# Patient Record
Sex: FEMALE | Race: WHITE | NOT HISPANIC OR LATINO
[De-identification: names, ages, dates, MRNs, and addresses within clinical notes are randomized per-mention and may not be internally consistent; named-entity substitution may affect disease eponyms.]

---

## 2018-11-20 PROBLEM — Z00.00 ENCOUNTER FOR PREVENTIVE HEALTH EXAMINATION: Status: ACTIVE | Noted: 2018-11-20

## 2018-12-19 ENCOUNTER — APPOINTMENT (OUTPATIENT)
Dept: OBGYN | Facility: CLINIC | Age: 33
End: 2018-12-19

## 2021-02-01 ENCOUNTER — ASOB RESULT (OUTPATIENT)
Age: 36
End: 2021-02-01

## 2021-02-01 ENCOUNTER — APPOINTMENT (OUTPATIENT)
Dept: MATERNAL FETAL MEDICINE | Facility: CLINIC | Age: 36
End: 2021-02-01

## 2021-02-03 ENCOUNTER — ASOB RESULT (OUTPATIENT)
Age: 36
End: 2021-02-03

## 2021-02-03 ENCOUNTER — APPOINTMENT (OUTPATIENT)
Dept: ANTEPARTUM | Facility: CLINIC | Age: 36
End: 2021-02-03
Payer: COMMERCIAL

## 2021-02-03 PROCEDURE — 36416 COLLJ CAPILLARY BLOOD SPEC: CPT

## 2021-02-03 PROCEDURE — 76813 OB US NUCHAL MEAS 1 GEST: CPT

## 2021-02-03 PROCEDURE — 76801 OB US < 14 WKS SINGLE FETUS: CPT

## 2021-02-03 PROCEDURE — 99072 ADDL SUPL MATRL&STAF TM PHE: CPT

## 2021-02-12 ENCOUNTER — NON-APPOINTMENT (OUTPATIENT)
Age: 36
End: 2021-02-12

## 2021-04-06 ENCOUNTER — APPOINTMENT (OUTPATIENT)
Dept: ANTEPARTUM | Facility: CLINIC | Age: 36
End: 2021-04-06
Payer: COMMERCIAL

## 2021-04-06 ENCOUNTER — ASOB RESULT (OUTPATIENT)
Age: 36
End: 2021-04-06

## 2021-04-06 PROCEDURE — 99072 ADDL SUPL MATRL&STAF TM PHE: CPT

## 2021-04-06 PROCEDURE — 76811 OB US DETAILED SNGL FETUS: CPT

## 2021-08-04 ENCOUNTER — INPATIENT (INPATIENT)
Facility: HOSPITAL | Age: 36
LOS: 3 days | Discharge: HOME | End: 2021-08-08
Attending: OBSTETRICS & GYNECOLOGY | Admitting: OBSTETRICS & GYNECOLOGY

## 2021-08-04 VITALS
OXYGEN SATURATION: 100 % | WEIGHT: 195.11 LBS | HEIGHT: 68 IN | RESPIRATION RATE: 17 BRPM | TEMPERATURE: 99 F | DIASTOLIC BLOOD PRESSURE: 84 MMHG | SYSTOLIC BLOOD PRESSURE: 152 MMHG | HEART RATE: 100 BPM

## 2021-08-04 DIAGNOSIS — Z3A.00 WEEKS OF GESTATION OF PREGNANCY NOT SPECIFIED: ICD-10-CM

## 2021-08-04 DIAGNOSIS — O26.899 OTHER SPECIFIED PREGNANCY RELATED CONDITIONS, UNSPECIFIED TRIMESTER: ICD-10-CM

## 2021-08-04 DIAGNOSIS — D22.9 MELANOCYTIC NEVI, UNSPECIFIED: Chronic | ICD-10-CM

## 2021-08-04 LAB
ALBUMIN SERPL ELPH-MCNC: 4.2 G/DL — SIGNIFICANT CHANGE UP (ref 3.3–5)
ALP SERPL-CCNC: 140 U/L — HIGH (ref 40–120)
ALT FLD-CCNC: 13 U/L — SIGNIFICANT CHANGE UP (ref 4–33)
ANION GAP SERPL CALC-SCNC: 15 MMOL/L — HIGH (ref 7–14)
APPEARANCE UR: CLEAR — SIGNIFICANT CHANGE UP
APTT BLD: 29.3 SEC — SIGNIFICANT CHANGE UP (ref 27–36.3)
AST SERPL-CCNC: 23 U/L — SIGNIFICANT CHANGE UP (ref 4–32)
BASOPHILS # BLD AUTO: 0.02 K/UL — SIGNIFICANT CHANGE UP (ref 0–0.2)
BASOPHILS NFR BLD AUTO: 0.2 % — SIGNIFICANT CHANGE UP (ref 0–2)
BILIRUB SERPL-MCNC: 0.2 MG/DL — SIGNIFICANT CHANGE UP (ref 0.2–1.2)
BILIRUB UR-MCNC: NEGATIVE — SIGNIFICANT CHANGE UP
BLD GP AB SCN SERPL QL: NEGATIVE — SIGNIFICANT CHANGE UP
BUN SERPL-MCNC: 6 MG/DL — LOW (ref 7–23)
CALCIUM SERPL-MCNC: 10.2 MG/DL — SIGNIFICANT CHANGE UP (ref 8.4–10.5)
CHLORIDE SERPL-SCNC: 102 MMOL/L — SIGNIFICANT CHANGE UP (ref 98–107)
CO2 SERPL-SCNC: 20 MMOL/L — LOW (ref 22–31)
COLOR SPEC: SIGNIFICANT CHANGE UP
CREAT ?TM UR-MCNC: 18 MG/DL — SIGNIFICANT CHANGE UP
CREAT SERPL-MCNC: 0.52 MG/DL — SIGNIFICANT CHANGE UP (ref 0.5–1.3)
DIFF PNL FLD: NEGATIVE — SIGNIFICANT CHANGE UP
EOSINOPHIL # BLD AUTO: 0.06 K/UL — SIGNIFICANT CHANGE UP (ref 0–0.5)
EOSINOPHIL NFR BLD AUTO: 0.7 % — SIGNIFICANT CHANGE UP (ref 0–6)
FIBRINOGEN PPP-MCNC: 762 MG/DL — HIGH (ref 290–520)
GLUCOSE SERPL-MCNC: 92 MG/DL — SIGNIFICANT CHANGE UP (ref 70–99)
GLUCOSE UR QL: NEGATIVE — SIGNIFICANT CHANGE UP
HCT VFR BLD CALC: 39.4 % — SIGNIFICANT CHANGE UP (ref 34.5–45)
HGB BLD-MCNC: 12.2 G/DL — SIGNIFICANT CHANGE UP (ref 11.5–15.5)
IANC: 6.84 K/UL — SIGNIFICANT CHANGE UP (ref 1.5–8.5)
IMM GRANULOCYTES NFR BLD AUTO: 0.7 % — SIGNIFICANT CHANGE UP (ref 0–1.5)
INR BLD: 0.99 RATIO — SIGNIFICANT CHANGE UP (ref 0.88–1.16)
KETONES UR-MCNC: NEGATIVE — SIGNIFICANT CHANGE UP
LDH SERPL L TO P-CCNC: 227 U/L — HIGH (ref 135–225)
LEUKOCYTE ESTERASE UR-ACNC: NEGATIVE — SIGNIFICANT CHANGE UP
LYMPHOCYTES # BLD AUTO: 1.58 K/UL — SIGNIFICANT CHANGE UP (ref 1–3.3)
LYMPHOCYTES # BLD AUTO: 17.3 % — SIGNIFICANT CHANGE UP (ref 13–44)
MCHC RBC-ENTMCNC: 26.9 PG — LOW (ref 27–34)
MCHC RBC-ENTMCNC: 31 GM/DL — LOW (ref 32–36)
MCV RBC AUTO: 86.8 FL — SIGNIFICANT CHANGE UP (ref 80–100)
MONOCYTES # BLD AUTO: 0.59 K/UL — SIGNIFICANT CHANGE UP (ref 0–0.9)
MONOCYTES NFR BLD AUTO: 6.4 % — SIGNIFICANT CHANGE UP (ref 2–14)
NEUTROPHILS # BLD AUTO: 6.84 K/UL — SIGNIFICANT CHANGE UP (ref 1.8–7.4)
NEUTROPHILS NFR BLD AUTO: 74.7 % — SIGNIFICANT CHANGE UP (ref 43–77)
NITRITE UR-MCNC: NEGATIVE — SIGNIFICANT CHANGE UP
NRBC # BLD: 0 /100 WBCS — SIGNIFICANT CHANGE UP
NRBC # FLD: 0 K/UL — SIGNIFICANT CHANGE UP
PH UR: 7.5 — SIGNIFICANT CHANGE UP (ref 5–8)
PLATELET # BLD AUTO: 329 K/UL — SIGNIFICANT CHANGE UP (ref 150–400)
POTASSIUM SERPL-MCNC: 4.6 MMOL/L — SIGNIFICANT CHANGE UP (ref 3.5–5.3)
POTASSIUM SERPL-SCNC: 4.6 MMOL/L — SIGNIFICANT CHANGE UP (ref 3.5–5.3)
PROT ?TM UR-MCNC: 7 MG/DL — SIGNIFICANT CHANGE UP
PROT ?TM UR-MCNC: 7 MG/DL — SIGNIFICANT CHANGE UP
PROT SERPL-MCNC: 7.6 G/DL — SIGNIFICANT CHANGE UP (ref 6–8.3)
PROT UR-MCNC: NEGATIVE — SIGNIFICANT CHANGE UP
PROT/CREAT UR-RTO: 0.4 RATIO — HIGH (ref 0–0.2)
PROTHROM AB SERPL-ACNC: 11.4 SEC — SIGNIFICANT CHANGE UP (ref 10.6–13.6)
RBC # BLD: 4.54 M/UL — SIGNIFICANT CHANGE UP (ref 3.8–5.2)
RBC # FLD: 13.2 % — SIGNIFICANT CHANGE UP (ref 10.3–14.5)
RH IG SCN BLD-IMP: POSITIVE — SIGNIFICANT CHANGE UP
SARS-COV-2 RNA SPEC QL NAA+PROBE: SIGNIFICANT CHANGE UP
SODIUM SERPL-SCNC: 137 MMOL/L — SIGNIFICANT CHANGE UP (ref 135–145)
SP GR SPEC: 1.01 — LOW (ref 1.01–1.02)
URATE SERPL-MCNC: 2.5 MG/DL — SIGNIFICANT CHANGE UP (ref 2.5–7)
UROBILINOGEN FLD QL: SIGNIFICANT CHANGE UP
WBC # BLD: 9.15 K/UL — SIGNIFICANT CHANGE UP (ref 3.8–10.5)
WBC # FLD AUTO: 9.15 K/UL — SIGNIFICANT CHANGE UP (ref 3.8–10.5)

## 2021-08-04 RX ORDER — CITRIC ACID/SODIUM CITRATE 300-500 MG
15 SOLUTION, ORAL ORAL EVERY 6 HOURS
Refills: 0 | Status: DISCONTINUED | OUTPATIENT
Start: 2021-08-04 | End: 2021-08-05

## 2021-08-04 RX ORDER — SODIUM CHLORIDE 9 MG/ML
1000 INJECTION, SOLUTION INTRAVENOUS
Refills: 0 | Status: DISCONTINUED | OUTPATIENT
Start: 2021-08-04 | End: 2021-08-05

## 2021-08-04 RX ORDER — OXYTOCIN 10 UNIT/ML
333.33 VIAL (ML) INJECTION
Qty: 20 | Refills: 0 | Status: DISCONTINUED | OUTPATIENT
Start: 2021-08-04 | End: 2021-08-06

## 2021-08-04 RX ORDER — OXYTOCIN 10 UNIT/ML
333.33 VIAL (ML) INJECTION
Qty: 20 | Refills: 0 | Status: DISCONTINUED | OUTPATIENT
Start: 2021-08-04 | End: 2021-08-04

## 2021-08-04 RX ORDER — SODIUM CHLORIDE 9 MG/ML
3 INJECTION INTRAMUSCULAR; INTRAVENOUS; SUBCUTANEOUS EVERY 8 HOURS
Refills: 0 | Status: DISCONTINUED | OUTPATIENT
Start: 2021-08-04 | End: 2021-08-08

## 2021-08-04 RX ORDER — SODIUM CHLORIDE 9 MG/ML
1000 INJECTION, SOLUTION INTRAVENOUS
Refills: 0 | Status: DISCONTINUED | OUTPATIENT
Start: 2021-08-04 | End: 2021-08-04

## 2021-08-04 RX ORDER — CITRIC ACID/SODIUM CITRATE 300-500 MG
15 SOLUTION, ORAL ORAL EVERY 6 HOURS
Refills: 0 | Status: DISCONTINUED | OUTPATIENT
Start: 2021-08-04 | End: 2021-08-04

## 2021-08-04 RX ADMIN — Medication 15 MILLILITER(S): at 20:22

## 2021-08-04 NOTE — OB PROVIDER H&P - NSHPLABSRESULTS_GEN_ALL_CORE
CBC Full  -  ( 04 Aug 2021 14:38 )  WBC Count : 9.15 K/uL  RBC Count : 4.54 M/uL  Hemoglobin : 12.2 g/dL  Hematocrit : 39.4 %  Platelet Count - Automated : 329 K/uL  Mean Cell Volume : 86.8 fL  Mean Cell Hemoglobin : 26.9 pg  Mean Cell Hemoglobin Concentration : 31.0 gm/dL  Auto Neutrophil # : 6.84 K/uL  Auto Lymphocyte # : 1.58 K/uL  Auto Monocyte # : 0.59 K/uL  Auto Eosinophil # : 0.06 K/uL  Auto Basophil # : 0.02 K/uL  Auto Neutrophil % : 74.7 %  Auto Lymphocyte % : 17.3 %  Auto Monocyte % : 6.4 %  Auto Eosinophil % : 0.7 %  Auto Basophil % : 0.2 %        137  |  102  |  6<L>  ----------------------------<  92  4.6   |  20<L>  |  0.52    Ca    10.2      04 Aug 2021 14:38    TPro  7.6  /  Alb  4.2  /  TBili  0.2  /  DBili  x   /  AST  23  /  ALT  13  /  AlkPhos  140<H>      LDH: 227    uric acid: 2.5      PT/INR - ( 04 Aug 2021 14:38 )   PT: 11.4 sec;   INR: 0.99 ratio         PTT - ( 04 Aug 2021 14:38 )  PTT:29.3 sec    fibrinogen: 762    Urinalysis Basic - ( 04 Aug 2021 14:38 )    Color: Light Yellow / Appearance: Clear / S.007 / pH: x  Gluc: x / Ketone: Negative  / Bili: Negative / Urobili: <2 mg/dL   Blood: x / Protein: Negative / Nitrite: Negative   Leuk Esterase: Negative / RBC: x / WBC x   Sq Epi: x / Non Sq Epi: x / Bacteria: x      PCR: 0.4

## 2021-08-04 NOTE — CHART NOTE - NSCHARTNOTEFT_GEN_A_CORE
R1 Labor Progress Note    Patient examined at bedside for placement of vaginal cytotec.    SVE  0/0/-3    Vaginal cytotec dose #1 placed at 10p.    Charlene Ragland  PGY-1

## 2021-08-04 NOTE — OB PROVIDER H&P - HISTORY OF PRESENT ILLNESS
35 y/o  @ 38.3 wks gestation presents from Dr Day's office with elevated /80 and 148/80 denies any headache visual disturbances or right upper epigastric pain denies any uc's vb or lof reports +FM denies any n/v/d denies any fever or chills ap care has been uncomplicated thus far     Covid vaccine   [employer] #2 3/20/2021

## 2021-08-04 NOTE — OB RN TRIAGE NOTE - CHIEF COMPLAINT QUOTE
sent from MD office with elevated BP  denies headache, visual changes, SOB, chest/epigastric pain, dizziness, vomiting, swelling

## 2021-08-04 NOTE — OB PROVIDER TRIAGE NOTE - HISTORY OF PRESENT ILLNESS
37 y/o  @ 38.3 wks gestation presents from Dr Day's office with elevated /80 and 148/80 denies any headache visual disturbances or right upper epigastric pain denies any uc's vb or lof reports +FM denies any n/v/d denies any fever or chills ap care has been uncomplicated thus far     Covid vaccine   [employer] #2 3/20/2021

## 2021-08-04 NOTE — OB PROVIDER TRIAGE NOTE - NSHPPHYSICALEXAM_GEN_ALL_CORE
abdomen: soft, nt on palp  T(C): 37.2 (08-04-21 @ 13:59), Max: 37.2 (08-04-21 @ 13:53)  HR: 105 (08-04-21 @ 15:20) (92 - 113)  BP: 114/68 (08-04-21 @ 15:20) (114/68 - 152/84) semi fowlers position   RR: 17 (08-04-21 @ 13:53) (17 - 17)  SpO2: 100% (08-04-21 @ 13:53) (100% - 100%) abdomen: soft, nt on palp  T(C): 37.2 (08-04-21 @ 13:59), Max: 37.2 (08-04-21 @ 13:53)  HR: 105 (08-04-21 @ 15:20) (92 - 113)  BP: 114/68 (08-04-21 @ 15:20) (114/68 - 152/84) semi fowlers position   RR: 17 (08-04-21 @ 13:53) (17 - 17)  SpO2: 100% (08-04-21 @ 13:53) (100% - 100%)  TAS: BPP: 8/8 vtx DEUCE: 15.65 posterior placenta

## 2021-08-04 NOTE — OB PROVIDER TRIAGE NOTE - NSOBPROVIDERNOTE_OBGYN_ALL_OB_FT
37 y/o  @ 38.3 wks gestation for po cytotec IOL / PEC   plan of care d/w dr yu  admit to l&D  38.3 wks gestation for po cytotec IOL/ PEC   see admission orders 35 y/o  @ 38.3 wks gestation for po cytotec IOL / PEC   plan of care d/w dr yu / dr frankel   admit to l&D  38.3 wks gestation for po cytotec IOL/ PEC   see admission orders

## 2021-08-04 NOTE — OB PROVIDER TRIAGE NOTE - NSHPLABSRESULTS_GEN_ALL_CORE
CBC Full  -  ( 04 Aug 2021 14:38 )  WBC Count : 9.15 K/uL  RBC Count : 4.54 M/uL  Hemoglobin : 12.2 g/dL  Hematocrit : 39.4 %  Platelet Count - Automated : 329 K/uL  Mean Cell Volume : 86.8 fL  Mean Cell Hemoglobin : 26.9 pg  Mean Cell Hemoglobin Concentration : 31.0 gm/dL  Auto Neutrophil # : 6.84 K/uL  Auto Lymphocyte # : 1.58 K/uL  Auto Monocyte # : 0.59 K/uL  Auto Eosinophil # : 0.06 K/uL  Auto Basophil # : 0.02 K/uL  Auto Neutrophil % : 74.7 %  Auto Lymphocyte % : 17.3 %  Auto Monocyte % : 6.4 %  Auto Eosinophil % : 0.7 %  Auto Basophil % : 0.2 %        137  |  102  |  6<L>  ----------------------------<  92  4.6   |  20<L>  |  0.52    Ca    10.2      04 Aug 2021 14:38    TPro  7.6  /  Alb  4.2  /  TBili  0.2  /  DBili  x   /  AST  23  /  ALT  13  /  AlkPhos  140<H>      PT/INR - ( 04 Aug 2021 14:38 )   PT: 11.4 sec;   INR: 0.99 ratio         PTT - ( 04 Aug 2021 14:38 )  PTT:29.3 sec    Urinalysis Basic - ( 04 Aug 2021 14:38 )    Color: Light Yellow / Appearance: Clear / S.007 / pH: x  Gluc: x / Ketone: Negative  / Bili: Negative / Urobili: <2 mg/dL   Blood: x / Protein: Negative / Nitrite: Negative   Leuk Esterase: Negative / RBC: x / WBC x   Sq Epi: x / Non Sq Epi: x / Bacteria: x CBC Full  -  ( 04 Aug 2021 14:38 )  WBC Count : 9.15 K/uL  RBC Count : 4.54 M/uL  Hemoglobin : 12.2 g/dL  Hematocrit : 39.4 %  Platelet Count - Automated : 329 K/uL  Mean Cell Volume : 86.8 fL  Mean Cell Hemoglobin : 26.9 pg  Mean Cell Hemoglobin Concentration : 31.0 gm/dL  Auto Neutrophil # : 6.84 K/uL  Auto Lymphocyte # : 1.58 K/uL  Auto Monocyte # : 0.59 K/uL  Auto Eosinophil # : 0.06 K/uL  Auto Basophil # : 0.02 K/uL  Auto Neutrophil % : 74.7 %  Auto Lymphocyte % : 17.3 %  Auto Monocyte % : 6.4 %  Auto Eosinophil % : 0.7 %  Auto Basophil % : 0.2 %        137  |  102  |  6<L>  ----------------------------<  92  4.6   |  20<L>  |  0.52    Ca    10.2      04 Aug 2021 14:38    TPro  7.6  /  Alb  4.2  /  TBili  0.2  /  DBili  x   /  AST  23  /  ALT  13  /  AlkPhos  140<H>      LDH: 227    uric acid: 2.5      PT/INR - ( 04 Aug 2021 14:38 )   PT: 11.4 sec;   INR: 0.99 ratio         PTT - ( 04 Aug 2021 14:38 )  PTT:29.3 sec    fibrinogen: 762    Urinalysis Basic - ( 04 Aug 2021 14:38 )    Color: Light Yellow / Appearance: Clear / S.007 / pH: x  Gluc: x / Ketone: Negative  / Bili: Negative / Urobili: <2 mg/dL   Blood: x / Protein: Negative / Nitrite: Negative   Leuk Esterase: Negative / RBC: x / WBC x   Sq Epi: x / Non Sq Epi: x / Bacteria: x      PCR: 0.4

## 2021-08-04 NOTE — OB PROVIDER H&P - ASSESSMENT
35 y/o  @ 38.3 wks gestation for po cytotec IOL / PEC   plan of care d/w dr yu / dr frankel   GBS- neg 2021  admit to l&D  38.3 wks gestation for po cytotec IOL/ PEC   see admission orders

## 2021-08-04 NOTE — OB PROVIDER H&P - NSHPPHYSICALEXAM_GEN_ALL_CORE
abdomen: soft, nt on palp  T(C): 37.2 (08-04-21 @ 13:59), Max: 37.2 (08-04-21 @ 13:53)  HR: 105 (08-04-21 @ 15:20) (92 - 113)  BP: 114/68 (08-04-21 @ 15:20) (114/68 - 152/84) semi fowlers position   RR: 17 (08-04-21 @ 13:53) (17 - 17)  SpO2: 100% (08-04-21 @ 13:53) (100% - 100%)  TAS: BPP: 8/8 vtx DEUCE: 15.65 posterior placenta    SVE: closed/0/-3

## 2021-08-05 ENCOUNTER — TRANSCRIPTION ENCOUNTER (OUTPATIENT)
Age: 36
End: 2021-08-05

## 2021-08-05 LAB
ALBUMIN SERPL ELPH-MCNC: 3.4 G/DL — SIGNIFICANT CHANGE UP (ref 3.3–5)
ALBUMIN SERPL ELPH-MCNC: 3.8 G/DL — SIGNIFICANT CHANGE UP (ref 3.3–5)
ALP SERPL-CCNC: 119 U/L — SIGNIFICANT CHANGE UP (ref 40–120)
ALP SERPL-CCNC: 126 U/L — HIGH (ref 40–120)
ALT FLD-CCNC: 10 U/L — SIGNIFICANT CHANGE UP (ref 4–33)
ALT FLD-CCNC: 10 U/L — SIGNIFICANT CHANGE UP (ref 4–33)
ANION GAP SERPL CALC-SCNC: 14 MMOL/L — SIGNIFICANT CHANGE UP (ref 7–14)
ANION GAP SERPL CALC-SCNC: 15 MMOL/L — HIGH (ref 7–14)
APTT BLD: 28.3 SEC — SIGNIFICANT CHANGE UP (ref 27–36.3)
APTT BLD: 28.4 SEC — SIGNIFICANT CHANGE UP (ref 27–36.3)
AST SERPL-CCNC: 14 U/L — SIGNIFICANT CHANGE UP (ref 4–32)
AST SERPL-CCNC: 16 U/L — SIGNIFICANT CHANGE UP (ref 4–32)
BASOPHILS # BLD AUTO: 0.03 K/UL — SIGNIFICANT CHANGE UP (ref 0–0.2)
BASOPHILS # BLD AUTO: 0.05 K/UL — SIGNIFICANT CHANGE UP (ref 0–0.2)
BASOPHILS NFR BLD AUTO: 0.2 % — SIGNIFICANT CHANGE UP (ref 0–2)
BASOPHILS NFR BLD AUTO: 0.4 % — SIGNIFICANT CHANGE UP (ref 0–2)
BILIRUB SERPL-MCNC: 0.3 MG/DL — SIGNIFICANT CHANGE UP (ref 0.2–1.2)
BILIRUB SERPL-MCNC: 0.4 MG/DL — SIGNIFICANT CHANGE UP (ref 0.2–1.2)
BUN SERPL-MCNC: 5 MG/DL — LOW (ref 7–23)
BUN SERPL-MCNC: 5 MG/DL — LOW (ref 7–23)
CALCIUM SERPL-MCNC: 9.2 MG/DL — SIGNIFICANT CHANGE UP (ref 8.4–10.5)
CALCIUM SERPL-MCNC: 9.5 MG/DL — SIGNIFICANT CHANGE UP (ref 8.4–10.5)
CHLORIDE SERPL-SCNC: 103 MMOL/L — SIGNIFICANT CHANGE UP (ref 98–107)
CHLORIDE SERPL-SCNC: 107 MMOL/L — SIGNIFICANT CHANGE UP (ref 98–107)
CO2 SERPL-SCNC: 16 MMOL/L — LOW (ref 22–31)
CO2 SERPL-SCNC: 17 MMOL/L — LOW (ref 22–31)
COVID-19 SPIKE DOMAIN AB INTERP: POSITIVE
COVID-19 SPIKE DOMAIN ANTIBODY RESULT: >250 U/ML — HIGH
CREAT SERPL-MCNC: 0.43 MG/DL — LOW (ref 0.5–1.3)
CREAT SERPL-MCNC: 0.45 MG/DL — LOW (ref 0.5–1.3)
EOSINOPHIL # BLD AUTO: 0.04 K/UL — SIGNIFICANT CHANGE UP (ref 0–0.5)
EOSINOPHIL # BLD AUTO: 0.1 K/UL — SIGNIFICANT CHANGE UP (ref 0–0.5)
EOSINOPHIL NFR BLD AUTO: 0.3 % — SIGNIFICANT CHANGE UP (ref 0–6)
EOSINOPHIL NFR BLD AUTO: 0.8 % — SIGNIFICANT CHANGE UP (ref 0–6)
FIBRINOGEN PPP-MCNC: 642 MG/DL — HIGH (ref 290–520)
FIBRINOGEN PPP-MCNC: 733 MG/DL — HIGH (ref 290–520)
GLUCOSE SERPL-MCNC: 85 MG/DL — SIGNIFICANT CHANGE UP (ref 70–99)
GLUCOSE SERPL-MCNC: 86 MG/DL — SIGNIFICANT CHANGE UP (ref 70–99)
HCT VFR BLD CALC: 36.3 % — SIGNIFICANT CHANGE UP (ref 34.5–45)
HCT VFR BLD CALC: 38.4 % — SIGNIFICANT CHANGE UP (ref 34.5–45)
HGB BLD-MCNC: 11.7 G/DL — SIGNIFICANT CHANGE UP (ref 11.5–15.5)
HGB BLD-MCNC: 12.4 G/DL — SIGNIFICANT CHANGE UP (ref 11.5–15.5)
IANC: 10.74 K/UL — HIGH (ref 1.5–8.5)
IANC: 9.27 K/UL — HIGH (ref 1.5–8.5)
IMM GRANULOCYTES NFR BLD AUTO: 0.6 % — SIGNIFICANT CHANGE UP (ref 0–1.5)
IMM GRANULOCYTES NFR BLD AUTO: 0.6 % — SIGNIFICANT CHANGE UP (ref 0–1.5)
INR BLD: 0.99 RATIO — SIGNIFICANT CHANGE UP (ref 0.88–1.16)
INR BLD: 1.05 RATIO — SIGNIFICANT CHANGE UP (ref 0.88–1.16)
LDH SERPL L TO P-CCNC: 145 U/L — SIGNIFICANT CHANGE UP (ref 135–225)
LDH SERPL L TO P-CCNC: 162 U/L — SIGNIFICANT CHANGE UP (ref 135–225)
LYMPHOCYTES # BLD AUTO: 1.82 K/UL — SIGNIFICANT CHANGE UP (ref 1–3.3)
LYMPHOCYTES # BLD AUTO: 13.5 % — SIGNIFICANT CHANGE UP (ref 13–44)
LYMPHOCYTES # BLD AUTO: 2.73 K/UL — SIGNIFICANT CHANGE UP (ref 1–3.3)
LYMPHOCYTES # BLD AUTO: 20.6 % — SIGNIFICANT CHANGE UP (ref 13–44)
MCHC RBC-ENTMCNC: 27.3 PG — SIGNIFICANT CHANGE UP (ref 27–34)
MCHC RBC-ENTMCNC: 27.6 PG — SIGNIFICANT CHANGE UP (ref 27–34)
MCHC RBC-ENTMCNC: 32.2 GM/DL — SIGNIFICANT CHANGE UP (ref 32–36)
MCHC RBC-ENTMCNC: 32.3 GM/DL — SIGNIFICANT CHANGE UP (ref 32–36)
MCV RBC AUTO: 84.8 FL — SIGNIFICANT CHANGE UP (ref 80–100)
MCV RBC AUTO: 85.5 FL — SIGNIFICANT CHANGE UP (ref 80–100)
MONOCYTES # BLD AUTO: 0.76 K/UL — SIGNIFICANT CHANGE UP (ref 0–0.9)
MONOCYTES # BLD AUTO: 1.02 K/UL — HIGH (ref 0–0.9)
MONOCYTES NFR BLD AUTO: 5.6 % — SIGNIFICANT CHANGE UP (ref 2–14)
MONOCYTES NFR BLD AUTO: 7.7 % — SIGNIFICANT CHANGE UP (ref 2–14)
NEUTROPHILS # BLD AUTO: 10.74 K/UL — HIGH (ref 1.8–7.4)
NEUTROPHILS # BLD AUTO: 9.27 K/UL — HIGH (ref 1.8–7.4)
NEUTROPHILS NFR BLD AUTO: 69.9 % — SIGNIFICANT CHANGE UP (ref 43–77)
NEUTROPHILS NFR BLD AUTO: 79.8 % — HIGH (ref 43–77)
NRBC # BLD: 0 /100 WBCS — SIGNIFICANT CHANGE UP
NRBC # BLD: 0 /100 WBCS — SIGNIFICANT CHANGE UP
NRBC # FLD: 0 K/UL — SIGNIFICANT CHANGE UP
NRBC # FLD: 0 K/UL — SIGNIFICANT CHANGE UP
PLATELET # BLD AUTO: 291 K/UL — SIGNIFICANT CHANGE UP (ref 150–400)
PLATELET # BLD AUTO: 324 K/UL — SIGNIFICANT CHANGE UP (ref 150–400)
POTASSIUM SERPL-MCNC: 3.9 MMOL/L — SIGNIFICANT CHANGE UP (ref 3.5–5.3)
POTASSIUM SERPL-MCNC: 3.9 MMOL/L — SIGNIFICANT CHANGE UP (ref 3.5–5.3)
POTASSIUM SERPL-SCNC: 3.9 MMOL/L — SIGNIFICANT CHANGE UP (ref 3.5–5.3)
POTASSIUM SERPL-SCNC: 3.9 MMOL/L — SIGNIFICANT CHANGE UP (ref 3.5–5.3)
PROT SERPL-MCNC: 6.5 G/DL — SIGNIFICANT CHANGE UP (ref 6–8.3)
PROT SERPL-MCNC: 6.8 G/DL — SIGNIFICANT CHANGE UP (ref 6–8.3)
PROTHROM AB SERPL-ACNC: 11.4 SEC — SIGNIFICANT CHANGE UP (ref 10.6–13.6)
PROTHROM AB SERPL-ACNC: 11.9 SEC — SIGNIFICANT CHANGE UP (ref 10.6–13.6)
RBC # BLD: 4.28 M/UL — SIGNIFICANT CHANGE UP (ref 3.8–5.2)
RBC # BLD: 4.49 M/UL — SIGNIFICANT CHANGE UP (ref 3.8–5.2)
RBC # FLD: 13.3 % — SIGNIFICANT CHANGE UP (ref 10.3–14.5)
RBC # FLD: 13.3 % — SIGNIFICANT CHANGE UP (ref 10.3–14.5)
RH IG SCN BLD-IMP: POSITIVE — SIGNIFICANT CHANGE UP
SARS-COV-2 IGG+IGM SERPL QL IA: >250 U/ML — HIGH
SARS-COV-2 IGG+IGM SERPL QL IA: POSITIVE
SODIUM SERPL-SCNC: 135 MMOL/L — SIGNIFICANT CHANGE UP (ref 135–145)
SODIUM SERPL-SCNC: 137 MMOL/L — SIGNIFICANT CHANGE UP (ref 135–145)
T PALLIDUM AB TITR SER: NEGATIVE — SIGNIFICANT CHANGE UP
URATE SERPL-MCNC: 2.8 MG/DL — SIGNIFICANT CHANGE UP (ref 2.5–7)
URATE SERPL-MCNC: 2.9 MG/DL — SIGNIFICANT CHANGE UP (ref 2.5–7)
WBC # BLD: 13.25 K/UL — HIGH (ref 3.8–10.5)
WBC # BLD: 13.47 K/UL — HIGH (ref 3.8–10.5)
WBC # FLD AUTO: 13.25 K/UL — HIGH (ref 3.8–10.5)
WBC # FLD AUTO: 13.47 K/UL — HIGH (ref 3.8–10.5)

## 2021-08-05 RX ADMIN — SODIUM CHLORIDE 125 MILLILITER(S): 9 INJECTION, SOLUTION INTRAVENOUS at 07:31

## 2021-08-05 NOTE — OB PROVIDER LABOR PROGRESS NOTE - NS_SUBJECTIVE/OBJECTIVE_OBGYN_ALL_OB_FT
PGY1 Labor & Delivery Progress Note     Pt examined @ 0620 to assess for labor progression and CRB balloon placement       SVE: 1/50/-3. Posterior cervix  EFM: 120/mod/(-)accels/(-)decels  [place]: q1-3min      T(C): 37.2 (08-04-21 @ 18:27), Max: 37.2 (08-04-21 @ 13:53)  HR: 76 (08-05-21 @ 07:16) (67 - 123)  BP: 118/63 (08-05-21 @ 07:16) (90/54 - 152/84)  RR: 20 (08-04-21 @ 18:27) (17 - 20)  SpO2: 100% (08-04-21 @ 13:53) (100% - 100%)
Pt evaluated at bedside. Requesting an epidural. Uncomfortable
Pt seen for placement of cervical balloon

## 2021-08-05 NOTE — OB PROVIDER LABOR PROGRESS NOTE - ASSESSMENT
Assessment & Plan:  36y  at 38.3wga admitted for PEC     #Labor   - s/p 20mcg Miso x1 and 25mcg Vaginal Miso  - CRB balloon unable to be placed successfully despite multiple attempts including utilization of re-positioning   - Pt amendable to another attempt in several hours    #Fetal Wellbeing   - Cat 1    # Issues   - BPs 90s-110s/50s-60s. Not requiring antihypertensives     #Pain Control   - Epidural, PO, and/or IV analgesia gerardn           Kareem Sanders, PGY-1    Plan per Dr. Olivo 
Cervical balloon placed without incident. Instilled with 60/60ccs. Pt tolerated very well.     35 y/o  @38+3w PEC IOL hellp NL PCR 0.4    -Continue PO cytotec  -Maintain cervical balloon    cristi, NP
Continue PO cytotec   Called for epidural     Dw: Dr. Olivo

## 2021-08-06 RX ORDER — AER TRAVELER 0.5 G/1
1 SOLUTION RECTAL; TOPICAL EVERY 4 HOURS
Refills: 0 | Status: DISCONTINUED | OUTPATIENT
Start: 2021-08-06 | End: 2021-08-08

## 2021-08-06 RX ORDER — ACETAMINOPHEN 500 MG
975 TABLET ORAL
Refills: 0 | Status: DISCONTINUED | OUTPATIENT
Start: 2021-08-06 | End: 2021-08-08

## 2021-08-06 RX ORDER — DIBUCAINE 1 %
1 OINTMENT (GRAM) RECTAL EVERY 6 HOURS
Refills: 0 | Status: DISCONTINUED | OUTPATIENT
Start: 2021-08-06 | End: 2021-08-08

## 2021-08-06 RX ORDER — MAGNESIUM HYDROXIDE 400 MG/1
30 TABLET, CHEWABLE ORAL
Refills: 0 | Status: DISCONTINUED | OUTPATIENT
Start: 2021-08-06 | End: 2021-08-08

## 2021-08-06 RX ORDER — OXYTOCIN 10 UNIT/ML
333.33 VIAL (ML) INJECTION
Qty: 20 | Refills: 0 | Status: DISCONTINUED | OUTPATIENT
Start: 2021-08-06 | End: 2021-08-06

## 2021-08-06 RX ORDER — OXYCODONE HYDROCHLORIDE 5 MG/1
5 TABLET ORAL ONCE
Refills: 0 | Status: DISCONTINUED | OUTPATIENT
Start: 2021-08-06 | End: 2021-08-08

## 2021-08-06 RX ORDER — KETOROLAC TROMETHAMINE 30 MG/ML
30 SYRINGE (ML) INJECTION ONCE
Refills: 0 | Status: DISCONTINUED | OUTPATIENT
Start: 2021-08-06 | End: 2021-08-06

## 2021-08-06 RX ORDER — DIPHENHYDRAMINE HCL 50 MG
25 CAPSULE ORAL EVERY 6 HOURS
Refills: 0 | Status: DISCONTINUED | OUTPATIENT
Start: 2021-08-06 | End: 2021-08-08

## 2021-08-06 RX ORDER — ACETAMINOPHEN 500 MG
3 TABLET ORAL
Qty: 0 | Refills: 0 | DISCHARGE
Start: 2021-08-06

## 2021-08-06 RX ORDER — IBUPROFEN 200 MG
600 TABLET ORAL EVERY 6 HOURS
Refills: 0 | Status: COMPLETED | OUTPATIENT
Start: 2021-08-06 | End: 2022-07-05

## 2021-08-06 RX ORDER — IBUPROFEN 200 MG
1 TABLET ORAL
Qty: 0 | Refills: 0 | DISCHARGE
Start: 2021-08-06

## 2021-08-06 RX ORDER — SENNA PLUS 8.6 MG/1
1 TABLET ORAL
Refills: 0 | Status: DISCONTINUED | OUTPATIENT
Start: 2021-08-06 | End: 2021-08-08

## 2021-08-06 RX ORDER — OXYCODONE HYDROCHLORIDE 5 MG/1
5 TABLET ORAL
Refills: 0 | Status: DISCONTINUED | OUTPATIENT
Start: 2021-08-06 | End: 2021-08-08

## 2021-08-06 RX ORDER — HYDROCORTISONE 1 %
1 OINTMENT (GRAM) TOPICAL EVERY 6 HOURS
Refills: 0 | Status: DISCONTINUED | OUTPATIENT
Start: 2021-08-06 | End: 2021-08-08

## 2021-08-06 RX ORDER — IBUPROFEN 200 MG
600 TABLET ORAL EVERY 6 HOURS
Refills: 0 | Status: DISCONTINUED | OUTPATIENT
Start: 2021-08-06 | End: 2021-08-08

## 2021-08-06 RX ORDER — SIMETHICONE 80 MG/1
80 TABLET, CHEWABLE ORAL EVERY 4 HOURS
Refills: 0 | Status: DISCONTINUED | OUTPATIENT
Start: 2021-08-06 | End: 2021-08-08

## 2021-08-06 RX ORDER — LANOLIN
1 OINTMENT (GRAM) TOPICAL EVERY 6 HOURS
Refills: 0 | Status: DISCONTINUED | OUTPATIENT
Start: 2021-08-06 | End: 2021-08-08

## 2021-08-06 RX ORDER — BENZOCAINE 10 %
1 GEL (GRAM) MUCOUS MEMBRANE EVERY 6 HOURS
Refills: 0 | Status: DISCONTINUED | OUTPATIENT
Start: 2021-08-06 | End: 2021-08-08

## 2021-08-06 RX ORDER — PRAMOXINE HYDROCHLORIDE 150 MG/15G
1 AEROSOL, FOAM RECTAL EVERY 4 HOURS
Refills: 0 | Status: DISCONTINUED | OUTPATIENT
Start: 2021-08-06 | End: 2021-08-08

## 2021-08-06 RX ORDER — TETANUS TOXOID, REDUCED DIPHTHERIA TOXOID AND ACELLULAR PERTUSSIS VACCINE, ADSORBED 5; 2.5; 8; 8; 2.5 [IU]/.5ML; [IU]/.5ML; UG/.5ML; UG/.5ML; UG/.5ML
0.5 SUSPENSION INTRAMUSCULAR ONCE
Refills: 0 | Status: DISCONTINUED | OUTPATIENT
Start: 2021-08-06 | End: 2021-08-08

## 2021-08-06 RX ADMIN — Medication 975 MILLIGRAM(S): at 13:48

## 2021-08-06 RX ADMIN — Medication 975 MILLIGRAM(S): at 14:37

## 2021-08-06 RX ADMIN — SODIUM CHLORIDE 3 MILLILITER(S): 9 INJECTION INTRAMUSCULAR; INTRAVENOUS; SUBCUTANEOUS at 06:44

## 2021-08-06 RX ADMIN — Medication 975 MILLIGRAM(S): at 23:47

## 2021-08-06 NOTE — OB PROVIDER DELIVERY SUMMARY - NSSELHIDDEN_OBGYN_ALL_OB_FT
[NS_DeliveryAttending1_OBGYN_ALL_OB_FT:[id number]],[NS_DeliveryAssist1_OBGYN_ALL_OB_FT:[id number]=]

## 2021-08-06 NOTE — DISCHARGE NOTE OB - CARE PLAN
Principal Discharge DX:	Delivery of   Goal:	Postpartum recovery  Assessment and plan of treatment:	admitted for elevated BP - Induction of labor/ - postpartum care

## 2021-08-06 NOTE — DISCHARGE NOTE OB - ADDITIONAL INSTRUCTIONS
IF severe pain, fever, heavy bleeding, chest or leg pain, shortness of breath call doctor or return to the hospital   call doctor for appointment in 2-3 weeks to check bp and pp evaluation

## 2021-08-06 NOTE — OB RN DELIVERY SUMMARY - NS_SEPSISRSKCALC_OBGYN_ALL_OB_FT
EOS calculated successfully. EOS Risk Factor: 0.5/1000 live births ([facility] national incidence); GA=38w4d; Temp=99.32; ROM=5.633; GBS='Negative'; Antibiotics='No antibiotics or any antibiotics < 2 hrs prior to birth'

## 2021-08-06 NOTE — DISCHARGE NOTE OB - HOSPITAL COURSE
35 y/o @ 38.3 wks gestation presents admitted with elevated BP from office visit /80 and 148/80 denies any headache visual disturbances or right upper epigastric Admitted and induced and  Female infant 2nd degree perineal laceration with good hemostasis and restoration of anatomy. Postpartum course

## 2021-08-06 NOTE — CHART NOTE - NSCHARTNOTEFT_GEN_A_CORE
R1 Labor Progress Note    Patient examined at bedside, feeling pressure.    SVE  9.5/100/-1  Cervical lip on R side    EFM  140/mod variability/+accels/-decels  [place]: q3-5 mins    - Peanut ball, re-position to maternal R side    Charlene Ragland  PGY-1

## 2021-08-06 NOTE — OB PROVIDER DELIVERY SUMMARY - NSPROVIDERDELIVERYNOTE_OBGYN_ALL_OB_FT
Spontaneous vaginal delivery of liveborn female.  Head, shoulders and body delivered easily. Nuchal x1.  Cord was delayed 1 minute. Cord was cut.  Infant was passed to mother.  Placenta delivered spontaneously intact. Uterine massage was performed and pitocin was given.  Fundus was firm. Second degree perineal laceration repaired with chromic. L labial laceration repaired with chromic interrupted suture.  Good hemostasis was noted.  Count correct x2. Spontaneous vaginal delivery of liveborn female.  Head, shoulders and body delivered easily. Nuchal x1- loose.  Cord was delayed 1 minute. Cord was cut.  Infant was passed to mother.  Placenta delivered spontaneously with IV Pitocin infusion and noted intact condition. Uterine massage was performed and Pitocin was continued.  Fundus was firm. Second degree perineal laceration repaired with chromic. L labial laceration repaired with chromic interrupted suture.  Good hemostasis was noted.  Count correct x2.

## 2021-08-06 NOTE — DISCHARGE NOTE OB - MEDICATION SUMMARY - MEDICATIONS TO TAKE
I will START or STAY ON the medications listed below when I get home from the hospital:    acetaminophen 325 mg oral tablet  -- 3 tab(s) by mouth   -- Indication: For pain, as needed    ibuprofen 600 mg oral tablet  -- 1 tab(s) by mouth every 6 hours  -- Indication: For pain, as needed    Prenatal 1 oral capsule  -- orally once a day  -- Indication: For Supplement

## 2021-08-06 NOTE — DISCHARGE NOTE OB - PATIENT PORTAL LINK FT
You can access the FollowMyHealth Patient Portal offered by [facility] by registering at the following website: [email or web address]. By joining [employer]’s FollowMyHealth portal, you will also be able to view your health information using other applications (apps) compatible with our system.

## 2021-08-06 NOTE — DISCHARGE NOTE OB - CARE PROVIDER_API CALL
Iliana Olivo)  Obstetrics and Gynecology  [address] [place] [address]  Phone: [phone]  Fax: [phone]  Follow Up Time:

## 2021-08-06 NOTE — LACTATION INITIAL EVALUATION - LACTATION INTERVENTIONS
initiate/review safe skin-to-skin/initiate/review hand expression/initiate/review pumping guidelines and safe milk handling/initiate/review techniques for position and latch/review techniques to increase milk supply/initiate/review breast massage/compression/reviewed components of an effective feeding and at least 8 effective feedings per day required/reviewed importance of monitoring infant diapers, the breastfeeding log, and minimum output each day/reviewed risks of unnecessary formula supplementation/reviewed benefits and recommendations for rooming in/reviewed feeding on demand/by cue at least 8 times a day/recommended follow-up with pediatrician within 24 hours of discharge

## 2021-08-07 RX ADMIN — Medication 975 MILLIGRAM(S): at 18:06

## 2021-08-07 RX ADMIN — Medication 975 MILLIGRAM(S): at 00:50

## 2021-08-07 RX ADMIN — SODIUM CHLORIDE 3 MILLILITER(S): 9 INJECTION INTRAMUSCULAR; INTRAVENOUS; SUBCUTANEOUS at 23:08

## 2021-08-07 RX ADMIN — Medication 975 MILLIGRAM(S): at 18:55

## 2021-08-07 NOTE — PROGRESS NOTE ADULT - PROBLEM SELECTOR PLAN 1
- Continue with PO analgesia  - Increase ambulation  - Continue regular diet    Charlene Ragland  PGY-1

## 2021-08-07 NOTE — PROGRESS NOTE ADULT - ASSESSMENT
35y/o  PPD#1 from normal spontaneous vaginal delivery in pregnancy complicated by preeclampsia. Patient feeling well, vital signs stable overnight - patient currently in stable condition. 35y/o  PPD#1 from normal spontaneous vaginal delivery in pregnancy complicated by preeclampsia. Patient feeling well, vital signs stable overnight - patient currently in stable condition.      Attending note     Patient without unusal c/o , on examination 5pm   VSS stable    abdomen soft nt fundus firm   continue ppcare   agree with resident evaluation   plan for discharge in am CR  35y/o  PPD#1 from normal spontaneous vaginal delivery in pregnancy complicated by preeclampsia. Patient feeling well, vital signs stable overnight - patient currently in stable condition.

## 2021-08-07 NOTE — PROGRESS NOTE ADULT - NSPROGADDITIONALINFOA_GEN_ALL_CORE
Reviewed and approved     Attending note     Patient without unusal c/o , on examination 5pm   VSS stable    abdomen soft nt fundus firm   continue ppcare   agree with resident evaluation   plan for discharge in am CR

## 2021-08-07 NOTE — PROGRESS NOTE ADULT - ASSESSMENT
A/P: 35yo PPD#1 s/p .  Patient is stable and doing well post-partum.   - Pain well controlled, continue current pain regimen  - Increase ambulation  - Continue regular diet    Kareem Sanders, PGY-1  Ob/Gyn

## 2021-08-08 VITALS
DIASTOLIC BLOOD PRESSURE: 76 MMHG | TEMPERATURE: 99 F | SYSTOLIC BLOOD PRESSURE: 129 MMHG | RESPIRATION RATE: 18 BRPM | HEART RATE: 71 BPM | OXYGEN SATURATION: 100 %

## 2021-08-08 RX ADMIN — Medication 600 MILLIGRAM(S): at 05:42

## 2021-08-08 RX ADMIN — Medication 600 MILLIGRAM(S): at 06:30

## 2021-08-08 RX ADMIN — Medication 975 MILLIGRAM(S): at 01:21

## 2021-08-08 RX ADMIN — Medication 975 MILLIGRAM(S): at 00:27

## 2021-08-08 NOTE — PROGRESS NOTE ADULT - SUBJECTIVE AND OBJECTIVE BOX
OB Progress Note:  PPD#1    S: 35yo PPD#1 s/p . Patient feels well. Pain is well controlled, tolerating regular diet, passing flatus, voiding spontaneously, and ambulating without difficulty. Deneis significant VB, chest pain, shortness of breath, n/v, light-headedness/dizziness. Denies headache or vision changes      O:  Vitals:  Vital Signs Last 24 Hrs  T(C): 36.4 (06 Aug 2021 17:52), Max: 37 (06 Aug 2021 10:00)  T(F): 97.5 (06 Aug 2021 17:52), Max: 98.6 (06 Aug 2021 10:00)  HR: 81 (06 Aug 2021 17:52) (78 - 110)  BP: 118/68 (06 Aug 2021 17:52) (108/74 - 127/76)  BP(mean): --  RR: 16 (06 Aug 2021 17:52) (16 - 18)  SpO2: 100% (06 Aug 2021 17:52) (82% - 100%)    MEDICATIONS  (STANDING):  acetaminophen   Tablet .. 975 milliGRAM(s) Oral <User Schedule>  diphtheria/tetanus/pertussis (acellular) Vaccine (ADAcel) 0.5 milliLiter(s) IntraMuscular once  ibuprofen  Tablet. 600 milliGRAM(s) Oral every 6 hours  prenatal multivitamin 1 Tablet(s) Oral daily  sodium chloride 0.9% lock flush 3 milliLiter(s) IV Push every 8 hours      Labs:  Blood type: B Positive  Rubella IgG: RPR: Negative                          12.4   13.47<H> >-----------< 324    (  @ 12:48 )             38.4                        11.7   13.25<H> >-----------< 291    (  @ 06:40 )             36.3                        12.2   9.15 >-----------< 329    (  @ 14:38 )             39.4    - @ 12:48      135  |  103  |  5<L>  ----------------------------<  86  3.9   |  17<L>  |  0.45<L>    08-05-21 @ 06:40      137  |  107  |  5<L>  ----------------------------<  85  3.9   |  16<L>  |  0.43<L>    21 @ 14:38      137  |  102  |  6<L>  ----------------------------<  92  4.6   |  20<L>  |  0.52        Ca    9.5      05 Aug 2021 12:48  Ca    9.2      05 Aug 2021 06:40  Ca    10.2      04 Aug 2021 14:38    TPro  6.8  /  Alb  3.8  /  TBili  0.4  /  DBili  x   /  AST  16  /  ALT  10  /  AlkPhos  126<H>  21 @ 12:48  TPro  6.5  /  Alb  3.4  /  TBili  0.3  /  DBili  x   /  AST  14  /  ALT  10  /  AlkPhos  119  21 @ 06:40  TPro  7.6  /  Alb  4.2  /  TBili  0.2  /  DBili  x   /  AST  23  /  ALT  13  /  AlkPhos  140<H>  21 @ 14:38          Physical Exam:  General: No acute distress  Respiratory: No respiratory distress. Unlabored breathing   Abdomen: Soft. Non-tender. Non-distended. Fundus is firm  Extremities: No pitting edema or calf tenderness bilaterally    
S: 37yo  PPD#2 s/p .  Hx of PEC HELLP labs WNL.  Patient feels well. Pain is well controlled. She is tolerating a regular diet and passing flatus. She is voiding spontaneously, and ambulating without difficulty. Denies CP/SOB. Denies lightheadedness/dizziness. Denies N/V.  Denies s/s of PEC: headaches, blurry vision or epigastric pain.      O:  Vitals:   Vital Signs Last 24 Hrs  T(C): 36.6 (08 Aug 2021 05:45), Max: 37.1 (07 Aug 2021 23:02)  T(F): 97.9 (08 Aug 2021 05:45), Max: 98.7 (07 Aug 2021 23:02)  HR: 74 (08 Aug 2021 05:45) (69 - 78)  BP: 134/81 (08 Aug 2021 05:45) (120/69 - 134/81)  BP(mean): --  RR: 18 (08 Aug 2021 05:45) (16 - 18)  SpO2: 99% (08 Aug 2021 05:45) (97% - 99%)    MEDICATIONS  (STANDING):  acetaminophen   Tablet .. 975 milliGRAM(s) Oral <User Schedule>  diphtheria/tetanus/pertussis (acellular) Vaccine (ADAcel) 0.5 milliLiter(s) IntraMuscular once  ibuprofen  Tablet. 600 milliGRAM(s) Oral every 6 hours  prenatal multivitamin 1 Tablet(s) Oral daily  sodium chloride 0.9% lock flush 3 milliLiter(s) IV Push every 8 hours    MEDICATIONS  (PRN):  benzocaine 20%/menthol 0.5% Spray 1 Spray(s) Topical every 6 hours PRN for Perineal discomfort  dibucaine 1% Ointment 1 Application(s) Topical every 6 hours PRN Perineal discomfort  diphenhydrAMINE 25 milliGRAM(s) Oral every 6 hours PRN Pruritus  hydrocortisone 1% Cream 1 Application(s) Topical every 6 hours PRN Moderate Pain (4-6)  lanolin Ointment 1 Application(s) Topical every 6 hours PRN nipple soreness  magnesium hydroxide Suspension 30 milliLiter(s) Oral two times a day PRN Constipation  oxyCODONE    IR 5 milliGRAM(s) Oral every 3 hours PRN Moderate to Severe Pain (4-10)  oxyCODONE    IR 5 milliGRAM(s) Oral once PRN Moderate to Severe Pain (4-10)  pramoxine 1%/zinc 5% Cream 1 Application(s) Topical every 4 hours PRN Moderate Pain (4-6)  senna 1 Tablet(s) Oral two times a day PRN Constipation  simethicone 80 milliGRAM(s) Chew every 4 hours PRN Gas  witch hazel Pads 1 Application(s) Topical every 4 hours PRN Perineal discomfort      Labs:  Blood type: B Positive  Rubella IgG: RPR: Negative      Physical Exam:  General: NAD  Abdomen: soft, non-tender, non-distended, fundus firm  Vaginal: Lochia wnl  Extremities: No erythema/edema    A/P: 37yo  PPD#2 s/p .  Patient is stable and doing well post-partum.    - Pain well controlled, continue current pain regimen  - Increase ambulation, SCDs when not ambulating  - Continue regular diet  - PEC HELLP labs WNL  - BPs stable 120-130s/70s-80s  -BP script given however patient states they ordered one already.  - s/s of PEC discussed with patient  - Discharge planning     Sylvia HOWARD-BC
A Script for a Blood Pressure Monitor was written and given to the patient with instructions.
Attending note     introduced myself to the patient and  - I explained all findings and plan of care   FHRT with decreased variability and variables   with change of position noted increased variability to moderate and almost complete resolution of variables   IUPC placed fir amnioinfusion   pelvic exam - 6-7cm / 100 / vtx /-1   SROM bloody   continue labor care - to transfer to an [facility] 
Patient seen and examined at bedside, no acute overnight events. No acute complaints, pain well controlled. Patient is ambulating, voiding spontaneously, passing gas, and tolerating regular diet. Denies CP, SOB, N/V, HA, blurred vision, epigastric pain. Patient states that she is doing well.    Vital Signs Last 24 Hours  T(C): 36.4 (08-06-21 @ 17:52), Max: 37 (08-06-21 @ 10:00)  HR: 81 (08-06-21 @ 17:52) (78 - 82)  BP: 118/68 (08-06-21 @ 17:52) (116/58 - 127/76)  RR: 16 (08-06-21 @ 17:52) (16 - 18)  SpO2: 100% (08-06-21 @ 17:52) (98% - 100%)    Physical Exam:  General: NAD  Abdomen: Soft, non-tender, non-distended, fundus firm  Pelvic: Lochia wnl, external exam of perineum clean and dry without swelling    Labs:    Blood Type: B Positive  Antibody Screen: --  RPR: Negative               12.4   13.47 )-----------( 324      ( 08-05 @ 12:48 )             38.4                11.7   13.25 )-----------( 291      ( 08-05 @ 06:40 )             36.3                12.2   9.15  )-----------( 329      ( 08-04 @ 14:38 )             39.4         MEDICATIONS  (STANDING):  acetaminophen   Tablet .. 975 milliGRAM(s) Oral <User Schedule>  diphtheria/tetanus/pertussis (acellular) Vaccine (ADAcel) 0.5 milliLiter(s) IntraMuscular once  ibuprofen  Tablet. 600 milliGRAM(s) Oral every 6 hours  prenatal multivitamin 1 Tablet(s) Oral daily  sodium chloride 0.9% lock flush 3 milliLiter(s) IV Push every 8 hours    MEDICATIONS  (PRN):  benzocaine 20%/menthol 0.5% Spray 1 Spray(s) Topical every 6 hours PRN for Perineal discomfort  dibucaine 1% Ointment 1 Application(s) Topical every 6 hours PRN Perineal discomfort  diphenhydrAMINE 25 milliGRAM(s) Oral every 6 hours PRN Pruritus  hydrocortisone 1% Cream 1 Application(s) Topical every 6 hours PRN Moderate Pain (4-6)  lanolin Ointment 1 Application(s) Topical every 6 hours PRN nipple soreness  magnesium hydroxide Suspension 30 milliLiter(s) Oral two times a day PRN Constipation  oxyCODONE    IR 5 milliGRAM(s) Oral every 3 hours PRN Moderate to Severe Pain (4-10)  oxyCODONE    IR 5 milliGRAM(s) Oral once PRN Moderate to Severe Pain (4-10)  pramoxine 1%/zinc 5% Cream 1 Application(s) Topical every 4 hours PRN Moderate Pain (4-6)  senna 1 Tablet(s) Oral two times a day PRN Constipation  simethicone 80 milliGRAM(s) Chew every 4 hours PRN Gas  witch hazel Pads 1 Application(s) Topical every 4 hours PRN Perineal discomfort

## 2021-08-18 ENCOUNTER — NON-APPOINTMENT (OUTPATIENT)
Age: 36
End: 2021-08-18

## 2021-11-16 ENCOUNTER — RESULT REVIEW (OUTPATIENT)
Age: 36
End: 2021-11-16

## 2022-04-04 ENCOUNTER — RESULT REVIEW (OUTPATIENT)
Age: 37
End: 2022-04-04

## 2022-04-04 NOTE — LACTATION INITIAL EVALUATION - SUCK/SWALLOW
Patient refused to go through medication list with me. I was unable to verify medications.    Sandee DECKER, Visit Facilitator     short bursts

## 2022-08-09 NOTE — OB RN DELIVERY SUMMARY - NS_DELIVERYASSIST1_OBGYN_ALL_OB_FT
Pregnancy dating, labs, ultrasound reports, prenatal testing, and problem list; prior records and results; and available outside records were reviewed and updated in EMR.  Pertinent findings were noted below.    Reason for Visit   Initial Prenatal Visit and Vomiting    HPI   25 y.o., at 13w5d by Estimated Date of Delivery: 23    Contractions: No   Bleeding: No   Loss of fluid: No   Fetal movement: No   Nausea: Yes   Vomiting: Yes   Headache: No     Exam   BP 92/60   Wt 53.2 kg (117 lb 4.6 oz)   LMP 2022 (Exact Date)     GENERAL: No acute distress  ABD: Gravid    Assessment and Plan   Encounter for supervision of normal first pregnancy in second trimester  -     Connected MOM Enrollment  -     Assign Connected MOM Program Consent Questionnaire  -     US MF Procedure (Viewpoint); Future; Expected date: 2022  -     ondansetron (ZOFRAN-ODT) 4 MG TbDL; Take 1 tablet (4 mg total) by mouth every 6 (six) hours as needed (Nausea and vomiting).  Dispense: 30 tablet; Refill: 2  -     promethazine (PHENERGAN) 25 MG tablet; Take 1 tablet (25 mg total) by mouth every 6 (six) hours as needed for Nausea.  Dispense: 30 tablet; Refill: 2    Rubella non-immune status, antepartum       Given orders for zofran and phenergan for nausea and vomiting   Order placed for anatomy scan     labor precautions given  Follow-up: 4 weeks     Tianna Pena MD  PGY-1  Obstetrics & Gynecology    tessa

## 2023-04-12 PROBLEM — O03.9 COMPLETE OR UNSPECIFIED SPONTANEOUS ABORTION WITHOUT COMPLICATION: Chronic | Status: ACTIVE | Noted: 2021-08-04

## 2023-04-12 PROBLEM — O02.81 INAPPROPRIATE CHANGE IN QUANTITATIVE HUMAN CHORIONIC GONADOTROPIN (HCG) IN EARLY PREGNANCY: Chronic | Status: ACTIVE | Noted: 2021-08-04

## 2023-05-18 ENCOUNTER — APPOINTMENT (OUTPATIENT)
Dept: ANTEPARTUM | Facility: CLINIC | Age: 38
End: 2023-05-18
Payer: COMMERCIAL

## 2023-05-18 ENCOUNTER — NON-APPOINTMENT (OUTPATIENT)
Age: 38
End: 2023-05-18

## 2023-05-18 ENCOUNTER — ASOB RESULT (OUTPATIENT)
Age: 38
End: 2023-05-18

## 2023-05-18 PROCEDURE — 76801 OB US < 14 WKS SINGLE FETUS: CPT

## 2023-05-18 PROCEDURE — 76813 OB US NUCHAL MEAS 1 GEST: CPT | Mod: 59

## 2023-07-11 ENCOUNTER — ASOB RESULT (OUTPATIENT)
Age: 38
End: 2023-07-11

## 2023-07-11 ENCOUNTER — APPOINTMENT (OUTPATIENT)
Dept: ANTEPARTUM | Facility: CLINIC | Age: 38
End: 2023-07-11
Payer: COMMERCIAL

## 2023-07-11 PROCEDURE — 76811 OB US DETAILED SNGL FETUS: CPT

## 2023-07-17 NOTE — LACTATION INITIAL EVALUATION - INTERVENTION OUTCOME
Pt respirations are even and unlabored, pt is alert and oriented X 4, speaking in complete sentences, bed is in the lowest position, call light is within reach, NAD noted. Will continue to follow plan of care.         Nevaeh Persaud RN  07/17/23 1635 On request of nurse, P1 mom Instructed in hand expression and massage with good return demonstration, + colostrum noted, able to latch infant to breast with short burst, deep latch, infant is sleepy and needed stimulation to stay awake, encouraged to call for further assistance./verbalizes understanding/demonstrates understanding of teaching/good return demonstration/needs met/Lactation team to follow up

## 2023-10-17 ENCOUNTER — APPOINTMENT (OUTPATIENT)
Dept: ANTEPARTUM | Facility: CLINIC | Age: 38
End: 2023-10-17
Payer: COMMERCIAL

## 2023-10-17 ENCOUNTER — ASOB RESULT (OUTPATIENT)
Age: 38
End: 2023-10-17

## 2023-10-17 PROCEDURE — 76816 OB US FOLLOW-UP PER FETUS: CPT

## 2023-10-17 PROCEDURE — 76819 FETAL BIOPHYS PROFIL W/O NST: CPT

## 2023-10-17 PROCEDURE — 99213 OFFICE O/P EST LOW 20 MIN: CPT | Mod: 25

## 2023-11-09 ENCOUNTER — APPOINTMENT (OUTPATIENT)
Dept: ANTEPARTUM | Facility: CLINIC | Age: 38
End: 2023-11-09

## 2023-11-09 ENCOUNTER — APPOINTMENT (OUTPATIENT)
Dept: ANTEPARTUM | Facility: CLINIC | Age: 38
End: 2023-11-09
Payer: COMMERCIAL

## 2023-11-09 ENCOUNTER — ASOB RESULT (OUTPATIENT)
Age: 38
End: 2023-11-09

## 2023-11-09 PROCEDURE — 76816 OB US FOLLOW-UP PER FETUS: CPT

## 2023-11-09 PROCEDURE — 76818 FETAL BIOPHYS PROFILE W/NST: CPT

## 2023-11-14 ENCOUNTER — APPOINTMENT (OUTPATIENT)
Dept: ANTEPARTUM | Facility: CLINIC | Age: 38
End: 2023-11-14
Payer: COMMERCIAL

## 2023-11-14 ENCOUNTER — ASOB RESULT (OUTPATIENT)
Age: 38
End: 2023-11-14

## 2023-11-14 PROCEDURE — 76818 FETAL BIOPHYS PROFILE W/NST: CPT

## 2023-11-21 ENCOUNTER — APPOINTMENT (OUTPATIENT)
Dept: ANTEPARTUM | Facility: CLINIC | Age: 38
End: 2023-11-21
Payer: COMMERCIAL

## 2023-11-21 ENCOUNTER — ASOB RESULT (OUTPATIENT)
Age: 38
End: 2023-11-21

## 2023-11-21 PROCEDURE — 76818 FETAL BIOPHYS PROFILE W/NST: CPT

## 2023-11-22 ENCOUNTER — INPATIENT (INPATIENT)
Facility: HOSPITAL | Age: 38
LOS: 2 days | Discharge: HOME | End: 2023-11-25
Attending: STUDENT IN AN ORGANIZED HEALTH CARE EDUCATION/TRAINING PROGRAM | Admitting: STUDENT IN AN ORGANIZED HEALTH CARE EDUCATION/TRAINING PROGRAM

## 2023-11-22 VITALS
DIASTOLIC BLOOD PRESSURE: 76 MMHG | RESPIRATION RATE: 16 BRPM | HEART RATE: 111 BPM | TEMPERATURE: 98 F | SYSTOLIC BLOOD PRESSURE: 141 MMHG

## 2023-11-22 DIAGNOSIS — D22.9 MELANOCYTIC NEVI, UNSPECIFIED: Chronic | ICD-10-CM

## 2023-11-22 DIAGNOSIS — O36.8190 DECREASED FETAL MOVEMENTS, UNSPECIFIED TRIMESTER, NOT APPLICABLE OR UNSPECIFIED: ICD-10-CM

## 2023-11-22 DIAGNOSIS — O26.899 OTHER SPECIFIED PREGNANCY RELATED CONDITIONS, UNSPECIFIED TRIMESTER: ICD-10-CM

## 2023-11-22 LAB
BASOPHILS # BLD AUTO: 0.04 K/UL — SIGNIFICANT CHANGE UP (ref 0–0.2)
BASOPHILS # BLD AUTO: 0.04 K/UL — SIGNIFICANT CHANGE UP (ref 0–0.2)
BASOPHILS NFR BLD AUTO: 0.4 % — SIGNIFICANT CHANGE UP (ref 0–2)
BASOPHILS NFR BLD AUTO: 0.4 % — SIGNIFICANT CHANGE UP (ref 0–2)
BLD GP AB SCN SERPL QL: NEGATIVE — SIGNIFICANT CHANGE UP
BLD GP AB SCN SERPL QL: NEGATIVE — SIGNIFICANT CHANGE UP
EOSINOPHIL # BLD AUTO: 0.03 K/UL — SIGNIFICANT CHANGE UP (ref 0–0.5)
EOSINOPHIL # BLD AUTO: 0.03 K/UL — SIGNIFICANT CHANGE UP (ref 0–0.5)
EOSINOPHIL NFR BLD AUTO: 0.3 % — SIGNIFICANT CHANGE UP (ref 0–6)
EOSINOPHIL NFR BLD AUTO: 0.3 % — SIGNIFICANT CHANGE UP (ref 0–6)
HCT VFR BLD CALC: 41.5 % — SIGNIFICANT CHANGE UP (ref 34.5–45)
HCT VFR BLD CALC: 41.5 % — SIGNIFICANT CHANGE UP (ref 34.5–45)
HGB BLD-MCNC: 13.2 G/DL — SIGNIFICANT CHANGE UP (ref 11.5–15.5)
HGB BLD-MCNC: 13.2 G/DL — SIGNIFICANT CHANGE UP (ref 11.5–15.5)
IANC: 7.39 K/UL — SIGNIFICANT CHANGE UP (ref 1.8–7.4)
IANC: 7.39 K/UL — SIGNIFICANT CHANGE UP (ref 1.8–7.4)
IMM GRANULOCYTES NFR BLD AUTO: 0.5 % — SIGNIFICANT CHANGE UP (ref 0–0.9)
IMM GRANULOCYTES NFR BLD AUTO: 0.5 % — SIGNIFICANT CHANGE UP (ref 0–0.9)
LYMPHOCYTES # BLD AUTO: 1.94 K/UL — SIGNIFICANT CHANGE UP (ref 1–3.3)
LYMPHOCYTES # BLD AUTO: 1.94 K/UL — SIGNIFICANT CHANGE UP (ref 1–3.3)
LYMPHOCYTES # BLD AUTO: 19.1 % — SIGNIFICANT CHANGE UP (ref 13–44)
LYMPHOCYTES # BLD AUTO: 19.1 % — SIGNIFICANT CHANGE UP (ref 13–44)
MCHC RBC-ENTMCNC: 27.6 PG — SIGNIFICANT CHANGE UP (ref 27–34)
MCHC RBC-ENTMCNC: 27.6 PG — SIGNIFICANT CHANGE UP (ref 27–34)
MCHC RBC-ENTMCNC: 31.8 GM/DL — LOW (ref 32–36)
MCHC RBC-ENTMCNC: 31.8 GM/DL — LOW (ref 32–36)
MCV RBC AUTO: 86.8 FL — SIGNIFICANT CHANGE UP (ref 80–100)
MCV RBC AUTO: 86.8 FL — SIGNIFICANT CHANGE UP (ref 80–100)
MONOCYTES # BLD AUTO: 0.73 K/UL — SIGNIFICANT CHANGE UP (ref 0–0.9)
MONOCYTES # BLD AUTO: 0.73 K/UL — SIGNIFICANT CHANGE UP (ref 0–0.9)
MONOCYTES NFR BLD AUTO: 7.2 % — SIGNIFICANT CHANGE UP (ref 2–14)
MONOCYTES NFR BLD AUTO: 7.2 % — SIGNIFICANT CHANGE UP (ref 2–14)
NEUTROPHILS # BLD AUTO: 7.39 K/UL — SIGNIFICANT CHANGE UP (ref 1.8–7.4)
NEUTROPHILS # BLD AUTO: 7.39 K/UL — SIGNIFICANT CHANGE UP (ref 1.8–7.4)
NEUTROPHILS NFR BLD AUTO: 72.5 % — SIGNIFICANT CHANGE UP (ref 43–77)
NEUTROPHILS NFR BLD AUTO: 72.5 % — SIGNIFICANT CHANGE UP (ref 43–77)
NRBC # BLD: 0 /100 WBCS — SIGNIFICANT CHANGE UP (ref 0–0)
NRBC # BLD: 0 /100 WBCS — SIGNIFICANT CHANGE UP (ref 0–0)
NRBC # FLD: 0 K/UL — SIGNIFICANT CHANGE UP (ref 0–0)
NRBC # FLD: 0 K/UL — SIGNIFICANT CHANGE UP (ref 0–0)
PLATELET # BLD AUTO: 266 K/UL — SIGNIFICANT CHANGE UP (ref 150–400)
PLATELET # BLD AUTO: 266 K/UL — SIGNIFICANT CHANGE UP (ref 150–400)
RBC # BLD: 4.78 M/UL — SIGNIFICANT CHANGE UP (ref 3.8–5.2)
RBC # BLD: 4.78 M/UL — SIGNIFICANT CHANGE UP (ref 3.8–5.2)
RBC # FLD: 13.9 % — SIGNIFICANT CHANGE UP (ref 10.3–14.5)
RBC # FLD: 13.9 % — SIGNIFICANT CHANGE UP (ref 10.3–14.5)
RH IG SCN BLD-IMP: POSITIVE — SIGNIFICANT CHANGE UP
RH IG SCN BLD-IMP: POSITIVE — SIGNIFICANT CHANGE UP
WBC # BLD: 10.18 K/UL — SIGNIFICANT CHANGE UP (ref 3.8–10.5)
WBC # BLD: 10.18 K/UL — SIGNIFICANT CHANGE UP (ref 3.8–10.5)
WBC # FLD AUTO: 10.18 K/UL — SIGNIFICANT CHANGE UP (ref 3.8–10.5)
WBC # FLD AUTO: 10.18 K/UL — SIGNIFICANT CHANGE UP (ref 3.8–10.5)

## 2023-11-22 RX ORDER — SODIUM CHLORIDE 9 MG/ML
1000 INJECTION, SOLUTION INTRAVENOUS
Refills: 0 | Status: DISCONTINUED | OUTPATIENT
Start: 2023-11-22 | End: 2023-11-23

## 2023-11-22 RX ORDER — CITRIC ACID/SODIUM CITRATE 300-500 MG
15 SOLUTION, ORAL ORAL EVERY 6 HOURS
Refills: 0 | Status: DISCONTINUED | OUTPATIENT
Start: 2023-11-22 | End: 2023-11-23

## 2023-11-22 RX ORDER — CHLORHEXIDINE GLUCONATE 213 G/1000ML
1 SOLUTION TOPICAL DAILY
Refills: 0 | Status: DISCONTINUED | OUTPATIENT
Start: 2023-11-22 | End: 2023-11-23

## 2023-11-22 RX ORDER — INFLUENZA VIRUS VACCINE 15; 15; 15; 15 UG/.5ML; UG/.5ML; UG/.5ML; UG/.5ML
0.5 SUSPENSION INTRAMUSCULAR ONCE
Refills: 0 | Status: DISCONTINUED | OUTPATIENT
Start: 2023-11-22 | End: 2023-11-25

## 2023-11-22 RX ORDER — OXYTOCIN 10 UNIT/ML
333.33 VIAL (ML) INJECTION
Qty: 20 | Refills: 0 | Status: DISCONTINUED | OUTPATIENT
Start: 2023-11-22 | End: 2023-11-23

## 2023-11-22 RX ADMIN — CHLORHEXIDINE GLUCONATE 1 APPLICATION(S): 213 SOLUTION TOPICAL at 21:15

## 2023-11-22 NOTE — OB PROVIDER H&P - NSHPPHYSICALEXAM_GEN_ALL_CORE
Adequate: hears normal conversation without difficulty T(C): 36.8 (11-22-23 @ 17:10), Max: 36.8 (11-22-23 @ 16:26)  HR: 88 (11-22-23 @ 17:13) (88 - 111)  BP: 125/78 (11-22-23 @ 17:13) (125/78 - 141/76)  RR: 16 (11-22-23 @ 16:26) (16 - 16)  SpO2: --  General: Female sitting comfortably in no apparent distress.   Head: Normocephalic. Atraumatic.   Eyes: No discharge, lids normal, conjunctiva normal  Lungs: No resp distress  Abdomen: Soft, nontender. Gravid.   TAUS:  Sono saved in ASOB.   NST: Reactive. Category 1.   Neuro: No facial asymmetry, no slurred speech, moves all 4 extremities  Mood: Alert and lucid, appropriate mood and affect no hearing aid/Mildly to Moderately Impaired: difficulty hearing in some environments or speaker may need to increase volume or speak distinctly

## 2023-11-22 NOTE — OB PROVIDER TRIAGE NOTE - NS_FINALEDD_OBGYN_ALL_OB_DT
[place] [address] [facility] [address] [place] [address]    Phone:  [phone]    Fax:  [phone]       Thank You for choosing us for your health care visit. We are glad to serve you and happy to provide you with this summary of your visit. Please help us to ensure we have accurate records. If you find anything that needs to be changed, please let our staff know as soon as possible.          Your Demographic Information     Patient Name Sex     Clifford Carter Male 1967       Ethnic Group Patient Race    Not of  or  Origin White      Your Visit Details     Date & Time Provider Department    3/10/2017 8:30 AM Conor Tirado MD [facility]      Your Upcoming Appointment*(Max 10)     2017  8:00 AM CDT   Follow-up Visit with Conor Tirado MD   [facility] ([facility])    [address] [place] [address] [address] [place] [address]   [phone]              Your To Do List     Follow-Up    Return in about 1 month (around 4/10/2017).      Your Vitals Were     BP Pulse Height Weight BMI Smoking Status    124/72 68 5' 9.5\" (1.765 m) 129 lb 3 oz (58.6 kg) 18.8 kg/m2 Current Every Day Smoker      Medications Prescribed or Re-Ordered Today     primidone (MYSOLINE) 50 MG tablet    Sig - Route: Take 1 tablet by mouth 4 times daily. - Oral    Class: Eprescribe    Pharmacy: [id number] - [place]MER  [address] & [facility] Ph #: [phone]      Your Current Medications Are        Disp Refills Start End    oxyCODONE/APAP (PERCOCET) 5-325 MG per tablet 120 tablet 0 3/3/2017     Sig - Route: Take 1 tablet by mouth every 4 hours as needed for Pain. - Oral    Notes to Pharmacy: Refill on or after 3/3/17, Supply to last 30 days.    Cosign for Ordering: Accepted by Conor Tirado MD  on 2/28/2017  9:50 AM    propranolol (INDERAL) 40 MG tablet 60 tablet 11 1/20/2017     Sig - Route: Take 1 tablet by mouth 2 times daily. - Oral    ropinirole (REQUIP) 4 MG tablet 30 tablet 5 1/2/2017     Sig - Route: Take 1 tablet by mouth nightly. - Oral    amitriptyline (ELAVIL) 25 MG tablet        Sig - Route: Take 25 mg by mouth nightly. - Oral    Class: Historical Med    amitriptyline (ELAVIL) 50 MG tablet 30 tablet 5 4/12/2016     Sig - Route: Take 1 tablet by mouth nightly. Takes along with a 25 mg strength - Oral    Class: Eprescribe    primidone (MYSOLINE) 50 MG tablet 60 tablet 11 3/10/2017     Sig - Route: Take 1 tablet by mouth 4 times daily. - Oral    Class: Eprescribe      Discontinued Medications        Reason for Discontinue    diltiazem (CARDIZEM CD) 240 MG 24 hr capsule Alternate Therapy      Allergies     No Known Allergies      Problem List as of 3/10/2017     Backache, unspecified    Depressive disorder, not elsewhere classified    Pain in joint, ankle and foot            Patient Instructions     None       23-Nov-2023

## 2023-11-22 NOTE — OB RN TRIAGE NOTE - FALL HARM RISK - UNIVERSAL INTERVENTIONS
Bed in lowest position, wheels locked, appropriate side rails in place/Call bell, personal items and telephone in reach/Instruct patient to call for assistance before getting out of bed or chair/Non-slip footwear when patient is out of bed/[place] to call system/Physically safe environment - no spills, clutter or unnecessary equipment/Purposeful Proactive Rounding/Room/bathroom lighting operational, light cord in reach

## 2023-11-22 NOTE — OB PROVIDER TRIAGE NOTE - HISTORY OF PRESENT ILLNESS
Ms. ANIL SKINNER is a 38y  @ 39w6 p/w decreased fetal movement today. + irregular ctx, 6/10. Denies lof, vb.   PNC: Dr. Kaur. Hx of IOL for preeclampsia @ 38w. Denies prenatal issues or complications. Pt reports no hospitalizations, procedures, infections, or new diagnoses in pregnancy. Pt denies complications with blood pressure and/or blood sugar.

## 2023-11-22 NOTE — OB PROVIDER TRIAGE NOTE - NSOBPROVIDERNOTE_OBGYN_ALL_OB_FT
Ms. ANIL SKINNER is a 38y  @ 39w6 p/w decreased fetal movement.     Plan  - Admit to Labor and Delivery for risk reducing induction of labor for decreased fetal movement   - Continuous EFM  - Clear diet, IV fluids  - Consent signed  - Standard labs (T&S, CBC, RPR)  - No preeclamptic labs at this time   - Induction/augmentation agent: Pitocin, cleared by Dr. Kaur   - Consider re-examination in 4 hours     Informed consent was obtained. The following was discussed:  - Induction/augmentation of labor: use of medication and/or cook balloon to begin or enhance labor  - Obstetrical management including internal fetal/contraction monitoring  - Normal vaginal delivery  - Possible  section    Risks, benefits, alternatives, and possible complications have been discussed in detail with the patient in English, patient's preferred language, demonstrating understanding, all questions answered.. Pre-admission, admission, and post admission procedures and expectations were discussed in detail. All questions answered, all appropriate hospital consents were signed. Anticipate normal vaginal delivery.    Evaluation and plan d/w Dr. Kaur

## 2023-11-22 NOTE — OB PROVIDER IHI INDUCTION/AUGMENTATION NOTE - NS_EFW_OBGYN_ALL_OB_FT
8513 Cheek Interpolation Flap Text: A decision was made to reconstruct the defect utilizing an interpolation axial flap and a staged reconstruction.  A telfa template was made of the defect.  This telfa template was then used to outline the Cheek Interpolation flap.  The donor area for the pedicle flap was then injected with anesthesia.  The flap was excised through the skin and subcutaneous tissue down to the layer of the underlying musculature.  The interpolation flap was carefully excised within this deep plane to maintain its blood supply.  The edges of the donor site were undermined.   The donor site was closed in a primary fashion.  The pedicle was then rotated into position and sutured.  Once the tube was sutured into place, adequate blood supply was confirmed with blanching and refill.  The pedicle was then wrapped with xeroform gauze and dressed appropriately with a telfa and gauze bandage to ensure continued blood supply and protect the attached pedicle.

## 2023-11-22 NOTE — OB RN PATIENT PROFILE - TOBACCO USE
Cm called and left voice message to   regarding discharge planning,waiting on return call Never smoker

## 2023-11-22 NOTE — OB PROVIDER H&P - ATTENDING COMMENTS
OB Attending Note    Agree w/ above.  P1 presents for IOL for polyhydramnios and suspected macrosomia.  Patient is eligible for trial of labor given EFW <5000g in non-diabetic patient.  Patient aware of risks of possible shoulder dystocia and its morbidity including but not limited to brachial plexus injury, neurodevelopmental impairment, and possible death as well as maternal morbidity including but not limited to increased risk of OASIS injury.  Patient desires trial of labor.  For pitocin augmentation.     ELAINA Kaur MD

## 2023-11-22 NOTE — OB RN TRIAGE NOTE - PATIENT'S PREFERRED PRONOUN
Health Maintenance Due   Topic Date Due   • Diabetes Eye Exam  04/22/2020       Defer to PCP         Her/She

## 2023-11-22 NOTE — OB PROVIDER H&P - NSLOWPPHRISK_OBGYN_A_OB
No previous uterine incision/[place] Pregnancy/Less than or equal to 4 previous vaginal births/No known bleeding disorder/No history of postpartum hemorrhage/No other PPH risks indicated

## 2023-11-22 NOTE — OB RN PATIENT PROFILE - NS_PRENATALLOC_OBGYN_ALL_OB
Per wife, pt began becoming unresponsive around 1900. Wife told Dr. Christiano Pitts this as well. MD Office

## 2023-11-22 NOTE — OB PROVIDER H&P - ASSESSMENT
Ms. ANIL SKINNER is a 38y  @ 39w6 p/w decreased fetal movement.     Plan  - Admit to Labor and Delivery for risk reducing induction of labor for decreased fetal movement   - Continuous EFM  - Clear diet, IV fluids  - Consent signed  - Standard labs (T&S, CBC, RPR)  - No preeclamptic labs at this time   - Induction/augmentation agent: Pitocin, cleared by Dr. Kaur   - Consider re-examination in 4 hours     Informed consent was obtained. The following was discussed:  - Induction/augmentation of labor: use of medication and/or cook balloon to begin or enhance labor  - Obstetrical management including internal fetal/contraction monitoring  - Normal vaginal delivery  - Possible  section    Risks, benefits, alternatives, and possible complications have been discussed in detail with the patient in English, patient's preferred language, demonstrating understanding, all questions answered.. Pre-admission, admission, and post admission procedures and expectations were discussed in detail. All questions answered, all appropriate hospital consents were signed. Anticipate normal vaginal delivery.    Evaluation and plan d/w Dr. Kaur Ms. ANIL SKINNER is a 38y  @ 39w6 p/w decreased fetal movement. Dr. Kaur notes exam in office  1 / 30 / -3.     Plan  - Admit to Labor and Delivery for risk reducing induction of labor for decreased fetal movement   - Continuous EFM  - Clear diet, IV fluids  - Consent signed  - Standard labs (T&S, CBC, RPR)  - No preeclamptic labs at this time   - Induction/augmentation agent: Pitocin, confirmed and cleared by Dr. Kaur   - Consider re-examination in 4 hours     Informed consent was obtained. The following was discussed:  - Induction/augmentation of labor: use of medication and/or cook balloon to begin or enhance labor  - Obstetrical management including internal fetal/contraction monitoring  - Normal vaginal delivery  - Possible  section    Risks, benefits, alternatives, and possible complications have been discussed in detail with the patient in English, patient's preferred language, demonstrating understanding, all questions answered.. Pre-admission, admission, and post admission procedures and expectations were discussed in detail. All questions answered, all appropriate hospital consents were signed. Anticipate normal vaginal delivery.    Evaluation and plan d/w Dr. Kaur

## 2023-11-22 NOTE — OB PROVIDER TRIAGE NOTE - NSHPPHYSICALEXAM_GEN_ALL_CORE
T(C): 36.8 (11-22-23 @ 17:10), Max: 36.8 (11-22-23 @ 16:26)  HR: 88 (11-22-23 @ 17:13) (88 - 111)  BP: 125/78 (11-22-23 @ 17:13) (125/78 - 141/76)  RR: 16 (11-22-23 @ 16:26) (16 - 16)  SpO2: --  General: Female sitting comfortably in no apparent distress.   Head: Normocephalic. Atraumatic.   Eyes: No discharge, lids normal, conjunctiva normal  Lungs: No resp distress  Abdomen: Soft, nontender. Gravid.   TAUS:  Sono saved in ASOB.   NST: Reactive. Category 1.   Neuro: No facial asymmetry, no slurred speech, moves all 4 extremities  Mood: Alert and lucid, appropriate mood and affect

## 2023-11-23 ENCOUNTER — TRANSCRIPTION ENCOUNTER (OUTPATIENT)
Age: 38
End: 2023-11-23

## 2023-11-23 LAB
T PALLIDUM AB TITR SER: NEGATIVE — SIGNIFICANT CHANGE UP
T PALLIDUM AB TITR SER: NEGATIVE — SIGNIFICANT CHANGE UP

## 2023-11-23 RX ORDER — SIMETHICONE 80 MG/1
80 TABLET, CHEWABLE ORAL EVERY 4 HOURS
Refills: 0 | Status: DISCONTINUED | OUTPATIENT
Start: 2023-11-23 | End: 2023-11-25

## 2023-11-23 RX ORDER — DIBUCAINE 1 %
1 OINTMENT (GRAM) RECTAL EVERY 6 HOURS
Refills: 0 | Status: DISCONTINUED | OUTPATIENT
Start: 2023-11-23 | End: 2023-11-25

## 2023-11-23 RX ORDER — HYDROCORTISONE 1 %
1 OINTMENT (GRAM) TOPICAL EVERY 6 HOURS
Refills: 0 | Status: DISCONTINUED | OUTPATIENT
Start: 2023-11-23 | End: 2023-11-25

## 2023-11-23 RX ORDER — IBUPROFEN 200 MG
600 TABLET ORAL EVERY 6 HOURS
Refills: 0 | Status: COMPLETED | OUTPATIENT
Start: 2023-11-23 | End: 2024-10-21

## 2023-11-23 RX ORDER — SODIUM CHLORIDE 9 MG/ML
3 INJECTION INTRAMUSCULAR; INTRAVENOUS; SUBCUTANEOUS EVERY 8 HOURS
Refills: 0 | Status: DISCONTINUED | OUTPATIENT
Start: 2023-11-23 | End: 2023-11-25

## 2023-11-23 RX ORDER — OXYCODONE HYDROCHLORIDE 5 MG/1
5 TABLET ORAL ONCE
Refills: 0 | Status: DISCONTINUED | OUTPATIENT
Start: 2023-11-23 | End: 2023-11-25

## 2023-11-23 RX ORDER — OXYCODONE HYDROCHLORIDE 5 MG/1
5 TABLET ORAL
Refills: 0 | Status: DISCONTINUED | OUTPATIENT
Start: 2023-11-23 | End: 2023-11-25

## 2023-11-23 RX ORDER — MAGNESIUM HYDROXIDE 400 MG/1
30 TABLET, CHEWABLE ORAL
Refills: 0 | Status: DISCONTINUED | OUTPATIENT
Start: 2023-11-23 | End: 2023-11-25

## 2023-11-23 RX ORDER — IBUPROFEN 200 MG
600 TABLET ORAL EVERY 6 HOURS
Refills: 0 | Status: DISCONTINUED | OUTPATIENT
Start: 2023-11-23 | End: 2023-11-25

## 2023-11-23 RX ORDER — AER TRAVELER 0.5 G/1
1 SOLUTION RECTAL; TOPICAL EVERY 4 HOURS
Refills: 0 | Status: DISCONTINUED | OUTPATIENT
Start: 2023-11-23 | End: 2023-11-25

## 2023-11-23 RX ORDER — PRAMOXINE HYDROCHLORIDE 150 MG/15G
1 AEROSOL, FOAM RECTAL EVERY 4 HOURS
Refills: 0 | Status: DISCONTINUED | OUTPATIENT
Start: 2023-11-23 | End: 2023-11-25

## 2023-11-23 RX ORDER — OXYTOCIN 10 UNIT/ML
VIAL (ML) INJECTION
Qty: 30 | Refills: 0 | Status: DISCONTINUED | OUTPATIENT
Start: 2023-11-23 | End: 2023-11-23

## 2023-11-23 RX ORDER — LANOLIN
1 OINTMENT (GRAM) TOPICAL EVERY 6 HOURS
Refills: 0 | Status: DISCONTINUED | OUTPATIENT
Start: 2023-11-23 | End: 2023-11-25

## 2023-11-23 RX ORDER — BENZOCAINE 10 %
1 GEL (GRAM) MUCOUS MEMBRANE EVERY 6 HOURS
Refills: 0 | Status: DISCONTINUED | OUTPATIENT
Start: 2023-11-23 | End: 2023-11-25

## 2023-11-23 RX ORDER — TETANUS TOXOID, REDUCED DIPHTHERIA TOXOID AND ACELLULAR PERTUSSIS VACCINE, ADSORBED 5; 2.5; 8; 8; 2.5 [IU]/.5ML; [IU]/.5ML; UG/.5ML; UG/.5ML; UG/.5ML
0.5 SUSPENSION INTRAMUSCULAR ONCE
Refills: 0 | Status: DISCONTINUED | OUTPATIENT
Start: 2023-11-23 | End: 2023-11-25

## 2023-11-23 RX ORDER — DIPHENHYDRAMINE HCL 50 MG
25 CAPSULE ORAL EVERY 6 HOURS
Refills: 0 | Status: DISCONTINUED | OUTPATIENT
Start: 2023-11-23 | End: 2023-11-25

## 2023-11-23 RX ORDER — ACETAMINOPHEN 500 MG
975 TABLET ORAL
Refills: 0 | Status: DISCONTINUED | OUTPATIENT
Start: 2023-11-23 | End: 2023-11-25

## 2023-11-23 RX ORDER — OXYTOCIN 10 UNIT/ML
333.33 VIAL (ML) INJECTION
Qty: 20 | Refills: 0 | Status: DISCONTINUED | OUTPATIENT
Start: 2023-11-23 | End: 2023-11-24

## 2023-11-23 RX ORDER — KETOROLAC TROMETHAMINE 30 MG/ML
30 SYRINGE (ML) INJECTION ONCE
Refills: 0 | Status: DISCONTINUED | OUTPATIENT
Start: 2023-11-23 | End: 2023-11-23

## 2023-11-23 RX ADMIN — Medication 30 MILLIGRAM(S): at 11:11

## 2023-11-23 RX ADMIN — SODIUM CHLORIDE 3 MILLILITER(S): 9 INJECTION INTRAMUSCULAR; INTRAVENOUS; SUBCUTANEOUS at 22:00

## 2023-11-23 RX ADMIN — Medication 975 MILLIGRAM(S): at 21:12

## 2023-11-23 RX ADMIN — SODIUM CHLORIDE 3 MILLILITER(S): 9 INJECTION INTRAMUSCULAR; INTRAVENOUS; SUBCUTANEOUS at 15:30

## 2023-11-23 RX ADMIN — Medication 975 MILLIGRAM(S): at 22:00

## 2023-11-23 RX ADMIN — Medication 600 MILLIGRAM(S): at 18:05

## 2023-11-23 RX ADMIN — Medication 30 MILLIGRAM(S): at 12:14

## 2023-11-23 RX ADMIN — Medication 1000 MILLIUNIT(S)/MIN: at 11:11

## 2023-11-23 RX ADMIN — Medication 600 MILLIGRAM(S): at 17:35

## 2023-11-23 RX ADMIN — Medication 600 MILLIGRAM(S): at 23:58

## 2023-11-23 RX ADMIN — Medication 2 MILLIUNIT(S)/MIN: at 00:33

## 2023-11-23 NOTE — OB NEONATOLOGY/PEDIATRICIAN DELIVERY SUMMARY - NSSUCTIONBYPEDSA_OBGYN_ALL_OB
"Ant reports that he has a \"really bad toothache\" - top right back molars.  He rates the pain ~8/10  The pain started ~10 pm (~20 min ago)    He took 800 mg ibuprofen and 2 acetaminophen    Per protocol, advised to call dentist when the office is open  If symptoms worsen tonight, consider ER.    COVID 19 Nurse Triage Plan/Patient Instructions    Please be aware that novel coronavirus (COVID-19) may be circulating in the community. If you develop symptoms such as fever, cough, or SOB or if you have concerns about the presence of another infection including coronavirus (COVID-19), please contact your health care provider or visit [email or web address].     Disposition/Instructions    In-Person Visit with provider recommended. Reference Visit Selection Guide.    Thank you for taking steps to prevent the spread of this virus.  o Limit your contact with others.  o Wear a simple mask to cover your cough.  o Wash your hands well and often.    Resources    [facility]: About COVID-19: [email or web address]    CDC: What to Do If You're Sick: www.cdc.gov/coronavirus/2019-ncov/about/steps-when-sick.html    CDC: Ending Home Isolation: www.cdc.gov/coronavirus/2019-ncov/hcp/disposition-in-home-patients.html     CDC: Caring for Someone: www.cdc.gov/coronavirus/2019-ncov/if-you-are-sick/care-for-someone.html     [facility]: Interim Guidance for Hospital Discharge to Home: www.health.[facility].mn.us/diseases/coronavirus/hcp/hospdischarge.pdf    [facility] clinical trials (COVID-19 research studies): [email or web address].[email or web address]/n-clinical-trials     Below are the COVID-19 hotlines at the [facility] ([facility]). Interpreters are available.   o For health questions: Call 367-175-4666 or 1-214.959.5093 (7 a.m. to 7 p.m.)  o For questions about schools and childcare: Call 650-422-5944 or 1-771.979.7761 (7 a.m. to 7 p.m.)     Luna Vance RN  [facility]      Reason for Disposition    " "Brown cavity visible in the painful tooth    Additional Information    Negative: Shock suspected (e.g., cold/pale/clammy skin, too weak to stand, low BP, rapid pulse)    Negative: [1] Similar pain previously AND [2] it was from \"heart attack\"    Negative: [1] Similar pain previously AND [2] it was from \"angina\" AND [3] not relieved by nitroglycerin    Negative: Sounds like a life-threatening emergency to the triager    Negative: Tongue is very swollen and tender    Negative: [1] Face is swollen AND [2] fever    Negative: Patient sounds very sick or weak to the triager    Negative: [1] SEVERE pain (e.g., excruciating, unable to do any normal activities) AND [2] not improved 2 hours after pain medicine    Negative: Face is very swollen    Negative: Fever    Negative: [1] Face is swollen AND [2] no fever    Negative: Toothache present > 24 hours    Negative: [1] Toothache AND [2] intermittent AND [3] brought on by hot or cold liquids    Protocols used: TOOTHACHE-A-AH      " Mouth/Nose

## 2023-11-23 NOTE — DISCHARGE NOTE OB - MEDICATION SUMMARY - MEDICATIONS TO STOP TAKING
I will STOP taking the medications listed below when I get home from the hospital:    baby aspirin

## 2023-11-23 NOTE — DISCHARGE NOTE OB - CARE PLAN
Principal Discharge DX:	Normal vaginal delivery  Assessment and plan of treatment:	nothing in the vagina x 6 weeks   1 Principal Discharge DX:	Normal vaginal delivery  Assessment and plan of treatment:	After discharge, please stay on pelvic rest for 6 weeks, meaning no sexual intercourse, no tampons and no douching.  No driving for 2 weeks.  No lifting objects heavier than baby for 2 weeks.  Expect to have vaginal bleeding/spotting for up to six weeks.  The bleeding should get lighter and more white/light brown with time.  For bleeding soaking more than a pad an hour or passing clots greater than the size of your fist, come in to the   emergency department.  Follow up in the office in 6 weeks

## 2023-11-23 NOTE — DISCHARGE NOTE OB - MATERIALS PROVIDED
Vaccinations/[facility]  Screening Program/  Immunization Record/Breastfeeding Log/Breastfeeding Mother’s Support Group Information/Guide to Postpartum Care/[facility] Hearing Screen Program/Back To Sleep Handout/Shaken Baby Prevention Handout/Breastfeeding Guide and Packet

## 2023-11-23 NOTE — OB NEONATOLOGY/PEDIATRICIAN DELIVERY SUMMARY - BABY A: APGAR 5 MIN HEART RATE, DELIVERY
Patient called , 1994 [facility] did not receive, can you please refax? Thank you! (2) more than 100 beats/min

## 2023-11-23 NOTE — DISCHARGE NOTE OB - CARE PROVIDER_API CALL
Makayla Warren  Obstetrics and Gynecology  [address], [address]  [place] [address]  Phone: [phone]  Fax: [phone]  Follow Up Time:

## 2023-11-23 NOTE — OB PROVIDER DELIVERY SUMMARY - NSSELHIDDEN_OBGYN_ALL_OB_FT
[NS_DeliveryAttending1_OBGYN_ALL_OB_FT:[email or web address]=] [NS_DeliveryAttending1_OBGYN_ALL_OB_FT:[email or web address]=],[NS_DeliveryAttending2_OBGYN_ALL_OB_FT:[id number]==]

## 2023-11-23 NOTE — OB NEONATOLOGY/PEDIATRICIAN DELIVERY SUMMARY - NSPEDSNEONOTESA_OBGYN_ALL_OB_FT
Pediatrician called to delivery for cat 2, shoulder dystocia. Male infant born at 40 wks via  to a 38 y/o  blood type B+ mother. Prenatal history of macrosomia . Prenatal labs nr/immune/-, GBS - on 10/26. ROM at 0427 on  with clear fluids. Baby emerged vigorous, crying. Cord clamping delayed 15 sec.  No clavicular crepitus or step off palpated. Infant was brought to radiant warmer and warmed, dried, stimulated and suctioned. HR>100, normal respiratory effort. APGARS of 8/9. Baby noted to have good tone and movement of bilateral upper extremities in delivery room. Symmetric [place] reflex. Mom is initiating breast feeding feeding. Consents to Hepatitis B vaccination. Declines for infant to be circumcised. EOS score 0.13. Pediatrician is [facility].  Baby stable for transfer to  nursery. Parents updated.

## 2023-11-23 NOTE — DISCHARGE NOTE OB - NS MD DC FALL RISK RISK
For information on Fall & Injury Prevention, visit: [email or web address]/news/fall-prevention-protects-and-maintains-health-and-mobility OR  https://www.[email or web address]/news/fall-prevention-tips-to-avoid-injury OR  https://www.cdc.gov/steadi/patient.html

## 2023-11-23 NOTE — OB NEONATOLOGY/PEDIATRICIAN DELIVERY SUMMARY - NSPHYSICALEXAMDETAILSA_OBGYN_ALL_OB_FT
facial bruising, small abrasion over L shoulder, symmetric [place] with normal motor activity of b/l UE,

## 2023-11-23 NOTE — OB RN DELIVERY SUMMARY - NS_SEPSISRSKCALC_OBGYN_ALL_OB_FT
EOS calculated successfully. EOS Risk Factor: 0.5/1000 live births ([facility] national incidence); GA=40w;Temp=98.78; ROM=5.617; GBS='Negative'; Antibiotics='No antibiotics or any antibiotics < 2 hrs prior to birth'

## 2023-11-23 NOTE — DISCHARGE NOTE OB - PLAN OF CARE
nothing in the vagina x 6 weeks After discharge, please stay on pelvic rest for 6 weeks, meaning no sexual intercourse, no tampons and no douching.  No driving for 2 weeks.  No lifting objects heavier than baby for 2 weeks.  Expect to have vaginal bleeding/spotting for up to six weeks.  The bleeding should get lighter and more white/light brown with time.  For bleeding soaking more than a pad an hour or passing clots greater than the size of your fist, come in to the   emergency department.  Follow up in the office in 6 weeks

## 2023-11-23 NOTE — OB RN DELIVERY SUMMARY - NSSELHIDDEN_OBGYN_ALL_OB_FT
[NS_DeliveryAttending1_OBGYN_ALL_OB_FT:[email or web address]=],[NS_DeliveryAttending2_OBGYN_ALL_OB_FT:[id number]],[NS_DeliveryRN_OBGYN_ALL_OB_FT:[id number]]

## 2023-11-23 NOTE — CHART NOTE - NSCHARTNOTEFT_GEN_A_CORE
OB Attending Note    Patient requesting one dose of cytotec prior to pitocin.  states had quick response to cytotec in past and so w ould like to try that as first step.   will do one dose of vaginal cytotec and then re-assess.     ELAINA Kaur MD
Attending Note     Pt feeling pressure     AFVSS  Gen in NAD   ABd soft, gravid  Ext: no c/c/e     FHTs 150 mod zac no decels + accels   IUPC q 2-4 min      A/P: P1 at term in actie labor  continue pitocin   shoulder precautions     R Briana BECERRA
Attending Note    PT feeling pressure     AFVSS  GEn in NAD   ABd soft, gravid  Ext; no c/c/e     SVE 8/100/-3  FHTS 145 mod zac no decels + accels   IUPC q 2-3 min pit 18 mu/min     A/P: multip at term in active labor   pt has previously counselled regarding LGA and risks of shoulder precautions  virgilio Warren MD
OB Attending Note    Patient seen and evaluated at bedside.  c/o mild intermittent ctx.     SVE 2/50/-3       /mod zac/+accel, -decel  toco ctx q4 mins     A/P P1 presents for IOL for suspected macrosomia and polyhydramnios   -Labor: will switch to pitocin augmentation  -Fetus: category 1 tracing  -Analgesia: declines at this time.     ELAINA Kaur MD
OB Attending Note    Patient seen and evaluated at bedside.  sp epidural.   comfortable.      SVE 3/60/-3  AROM- copious amount of clear fluid.  IUPC placed    /mod zac/+accel,-decel  [place] ctx q3 mins     Pitocin at 4 mu    A/P P1 presents for IOL for suspected macrosomia and polyhydramnios   -Labor: continue pitocin augmentation.  will increase based on adequacy now that IUPC in place.   -Fetus: category 1 tracing  -Analgesia: continue epidural     ELAINA Kaur MD

## 2023-11-23 NOTE — DISCHARGE NOTE OB - PATIENT PORTAL LINK FT
You can access the FollowMyHealth Patient Portal offered by [facility] by registering at the following website: [email or web address]. By joining [facility]’s FollowMyHealth portal, you will also be able to view your health information using other applications (apps) compatible with our system.

## 2023-11-23 NOTE — DISCHARGE NOTE OB - MEDICATION SUMMARY - MEDICATIONS TO TAKE
I will START or STAY ON the medications listed below when I get home from the hospital:    Prenatal 1 oral capsule  -- orally once a day  -- Indication: For Home med   I will START or STAY ON the medications listed below when I get home from the hospital:    ibuprofen 600 mg oral tablet  -- 1 tab(s) by mouth every 6 hours as needed for  moderate pain  -- Indication: For Pain    acetaminophen 500 mg oral tablet  -- 2 tab(s) by mouth every 6 hours as needed for  mild pain  -- Indication: For Pain    Prenatal 1 oral capsule  -- orally once a day  -- Indication: For Vitamin

## 2023-11-23 NOTE — OB PROVIDER DELIVERY SUMMARY - NSLOWPPHRISK_OBGYN_A_OB
No previous uterine incision/[place] Pregnancy/Less than or equal to 4 previous vaginal births/No known bleeding disorder/No history of postpartum hemorrhage/No other PPH risks indicated Less than or equal to 4 previous vaginal births

## 2023-11-24 RX ORDER — ACETAMINOPHEN 500 MG
2 TABLET ORAL
Qty: 0 | Refills: 0 | DISCHARGE
Start: 2023-11-24

## 2023-11-24 RX ORDER — IBUPROFEN 200 MG
1 TABLET ORAL
Qty: 0 | Refills: 0 | DISCHARGE
Start: 2023-11-24

## 2023-11-24 RX ADMIN — Medication 1 TABLET(S): at 10:55

## 2023-11-24 RX ADMIN — SODIUM CHLORIDE 3 MILLILITER(S): 9 INJECTION INTRAMUSCULAR; INTRAVENOUS; SUBCUTANEOUS at 05:44

## 2023-11-24 RX ADMIN — SODIUM CHLORIDE 3 MILLILITER(S): 9 INJECTION INTRAMUSCULAR; INTRAVENOUS; SUBCUTANEOUS at 14:18

## 2023-11-24 RX ADMIN — SODIUM CHLORIDE 3 MILLILITER(S): 9 INJECTION INTRAMUSCULAR; INTRAVENOUS; SUBCUTANEOUS at 22:00

## 2023-11-24 RX ADMIN — Medication 975 MILLIGRAM(S): at 20:12

## 2023-11-24 RX ADMIN — Medication 975 MILLIGRAM(S): at 20:42

## 2023-11-24 RX ADMIN — Medication 975 MILLIGRAM(S): at 09:39

## 2023-11-24 RX ADMIN — Medication 600 MILLIGRAM(S): at 19:00

## 2023-11-24 RX ADMIN — Medication 975 MILLIGRAM(S): at 08:55

## 2023-11-24 RX ADMIN — Medication 600 MILLIGRAM(S): at 06:17

## 2023-11-24 RX ADMIN — Medication 600 MILLIGRAM(S): at 00:45

## 2023-11-24 RX ADMIN — Medication 600 MILLIGRAM(S): at 18:25

## 2023-11-24 RX ADMIN — Medication 600 MILLIGRAM(S): at 05:38

## 2023-11-24 RX ADMIN — Medication 975 MILLIGRAM(S): at 14:55

## 2023-11-24 RX ADMIN — Medication 975 MILLIGRAM(S): at 15:23

## 2023-11-24 RX ADMIN — Medication 600 MILLIGRAM(S): at 10:55

## 2023-11-24 RX ADMIN — Medication 600 MILLIGRAM(S): at 11:34

## 2023-11-24 NOTE — PROGRESS NOTE ADULT - SUBJECTIVE AND OBJECTIVE BOX
OB Attending Progress Note:  PPD#1    S: 39yo  PPD#1 s/p . Patient feels well. Pain is well controlled. She is tolerating a regular diet and passing flatus. She is voiding spontaneously, and ambulating without difficulty. Denies CP/SOB. Denies lightheadedness/dizziness. Denies N/V.                            13.2   10.18 >-----------< 266    (  @ 18:55 )             41.5                  Physical Exam:  General: NAD  Abdomen: soft, non-tender, non-distended, fundus firm  Vaginal: Lochia wnl  Extremities: No erythema/edema, no calf tenderness b/l

## 2023-11-24 NOTE — PROGRESS NOTE ADULT - ASSESSMENT
A/P: 39yo PPD#1 s/p .  Patient is stable and doing well post-partum.   - Pain well controlled, continue current pain regimen  - Increase ambulation, SCDs when not ambulating  - Continue regular diet  -Discharge planning    E Natasha BECERRA

## 2023-11-25 VITALS — TEMPERATURE: 98 F

## 2023-11-25 RX ADMIN — Medication 975 MILLIGRAM(S): at 08:39

## 2023-11-25 RX ADMIN — Medication 975 MILLIGRAM(S): at 09:27

## 2023-11-25 RX ADMIN — Medication 600 MILLIGRAM(S): at 06:24

## 2023-11-25 RX ADMIN — Medication 975 MILLIGRAM(S): at 04:23

## 2023-11-25 RX ADMIN — Medication 600 MILLIGRAM(S): at 06:54

## 2023-11-25 RX ADMIN — Medication 975 MILLIGRAM(S): at 03:53

## 2023-11-25 RX ADMIN — SODIUM CHLORIDE 3 MILLILITER(S): 9 INJECTION INTRAMUSCULAR; INTRAVENOUS; SUBCUTANEOUS at 05:32

## 2023-12-12 NOTE — OB RN TRIAGE NOTE - PSH
Nutrition Note:    Calorie count incomplete from 12/11/23. Notified clinical nurse manager. Pt ate all his breakfast this am, nursing reported. Will follow up tomorrow. Ida Whitaker RD LDN  Clinical Dietitian  [facility]   Benign skin mole  removed

## 2024-01-03 NOTE — DISCHARGE NOTE OB - CALL YOUR HEALTHCARE PROVIDER IF YOU ARE EXPERIENCING SYMPTOMS OF DEPRESSION THAT LAST MORE THAN THREE DAYS
[FreeTextEntry1] : #1. Continue Advair 250/50 for mild asthma; attempts at lowering the dose resulted in increased symptoms so will continue current dose. Current evelio is normal on therapy. #2. Montelukast nightly. #3. Ventolin as needed. #4. Diet and exercise for weight loss. #5. SOBOE is likely related to weight and deconditioning. #6. Z-nish and prednisone taper for URI/wheeze/cough as needed. #7. Last CXR was clear on 4/4/23 when pt had URI symptoms. Will repeat. #8. Consider ENT evaluation if no improvement. Info given. #9. F/u in 2 weeks for re-evaluation. #10. Pt had both Covid vaccines, Covid infection, booster and flu vaccines; recovered from prior Covid infection from 10/2021 #11. Offered CPAP therapy for mild JOSE with an AHI of 7.2 on HST but pt does not want therapy for now given mild degree of JOSE with mild symptoms which does not necessarily require therapy; The patient understands the risks of untreated JOSE including heart disease, strokes, hypertension, pulmonary hypertension, and motor vehicle accidents as well as the need for treatment and weight loss.   The patient expressed understanding and agreement with the above recommendations/plan and accepts responsibility to be compliant with recommended testing, therapies, and f/u visits. All relevant questions and concerns were addressed. 
Statement Selected

## 2024-01-17 NOTE — OB PROVIDER TRIAGE NOTE - NS_ATTENDINFORMED_OBGYN_ALL_OB
Diagnosis:   Chronic right shoulder pain (M25.511,G89.29)       Referring Provider: Taya  Date of Evaluation:    1/10/2024    Precautions:  None Next MD visit:   none scheduled  Date of Surgery: n/a   Insurance Primary/Secondary: [employer] [place] HMO / N/A     # Auth Visits: 5 visits 1/1-3/31            Subjective: States that his arm gets a little sore with the exercises but that is to be expected. Has a hard time with lateral raises    Pain: 1-2/10 R shoulder      Objective: limited posterior GH JM with tension through posterior cuff.       Assessment: Patient displays good understanding and performance of HEP exercises. He was progressed in RC and scapular stabilization to improve shoulder mechanics to reduce impingement signs. Fatigues quickly with emphasis on RC strength and stabilization and will benefit from continuing to progress strength and endurance of this muscle group.         Goals:   (to be met in 10 visits)   Pt will report improved ability to sleep without waking due to shoulder pain   Pt will improve shoulder flexion AROM to >160 degrees to be able to reach into overhead cabinets without pain or restriction   Pt will improve shoulder abduction AROM to >170 degrees to improve ability to don deodorant, don/doff shirts, and wash hair   Pt will improve shoulder strength throughout to 5/5 to improve function with gym activities    Pt will demonstrate increased mid/low trap strength to 5/5 to promote improved shoulder mechanics and stabilization with lifting and reaching   Pt will be independent and compliant with comprehensive HEP to maintain progress achieved in PT        Plan: assess tolerance to exercises; schedule 5 more per POC if he would like.   Frequency / Duration: Patient will be seen for 2 x/week or a total of 10 visits over a 90 day period. Treatment will include: Manual Therapy, Neuromuscular Re-education, Therapeutic Activities, Therapeutic Exercise, Home Exercise Program instruction, and  Modalities to include: Electrical stimulation (unattended)   Date: 1/17/2024  TX#: 2/5 Date:                 TX#: 3/ Date:                 TX#: 4/ Date:                 TX#: 5/ Date:   Tx#: 6/   Manual: 5 min  STM: posterior cuff  JM: posterior and inferior R GH JM GR III/IV         Ther-Ex: 30 min  UBE 2'/2'   R shoulder PROM  Manual posterior cuff stretch supine, 2x30\"   Modified sleeper stretch, 2x30\"   Sidelying ER, 3#, 2x15  Sidelying HABD, 3#, 2x15  TRX row, 2x10  TRX pec stretch, 3 way, 1x30\" ea   Resisted HADD, 20#, 2x10  Standing abduction, 8#, ecc lower with scap squeeze, 2x10  Resisted HABD, green TB, 2x10  Resisted ER bilat, green TB, 2x10   Open book at wall, 1x15 B            NMR: 10 min  RS: 60, 90, 120 deg of 30\" ea  Supine serratus press, 5#, 2x10  Scap clock, red TB, 1'   Serratus wall slide, 2x10              HEP: Access Code: [email or web address]  URL: [email or web address]  Date: 01/10/2024  Prepared by: Francie Brooke     Exercises  - Shoulder External Rotation with Anchored Resistance  - 1 x daily - 7 x weekly - 3 sets - 10 reps - red band  - Shoulder Internal Rotation with Resistance  - 1 x daily - 7 x weekly - 3 sets - 10 reps - red band  - Shoulder Extension with Resistance - Palms Forward  - 1 x daily - 4 x weekly - 3 sets - 10 reps - red band  - Single Arm Scaption with Resistance  - 1 x daily - 7 x weekly - 3 sets - 10 reps - red band  - Doorway Pec Stretch at 90 Degrees Abduction  - 1 x daily - 7 x weekly - 2 sets - 30\" hold  - Doorway Pec Stretch at 60 Elevation  - 1 x daily - 7 x weekly - 2 sets - 30\" hold  - Standing Posterior Cuff Stretch  - 1 x daily - 7 x weekly - 2 sets - 30\" hold    Charges: 2 TE, 1 NMR         Total Timed Treatment: 45 min  Total Treatment Time: 45 min   Edilia Kaur MD

## 2024-12-05 NOTE — DISCHARGE NOTE OB - AVOID SITTING IN ONE POSITION FOR MORE THAN ONE HOUR
PLEASE READ AT LEAST 1 WEEK BEFORE YOUR PROCEDURE    Procedure Scheduling Checklist  Dr. Linder  Clinic: [phone]    Procedure Time: The surgery center/hospital will call a few days prior to your procedure with your arrival time. If you are taking Coumadin (Warfarin), please arrive 15 minutes earlier for an INR to be drawn.  For questions regarding your procedure times/instructions, call [phone].  Surgery will cancel your procedure if you are more than 15 minutes late!      Insurance:  Payor: MEDICARE / Plan: PARTA AND B / Product Type: MEDICARE    Surgery location:    [address] [place] [address] - Entrance 2    Procedure Date:      Procedure:                    12/16      Right Interlaminar Epidural Steroid Injection (IL JOHNNY)                      Blood Thinner: Aspirin-Containing Products/NSAIDS/Other Blood Thinning Products NEED TO BE HELD for 3 FULL days prior to your procedure(s) on 12/16. PLEASE SEE LIST BELOW! and HOLD COUMADIN (WARFARIN) for 5 FULL days prior to your procedure(s) on 12/16  Pacemaker/Defibrillator: No  Diabetic:  No  Heart Valve or Joint Replacement:  No  Antibiotic (if needed, take 1 hour before your procedure): No    Anesthesia:  LOCAL    Medication: If you are receiving IV Sedation, take only medication critical to your care (i.e. Heart and Blood Pressure medication) with only a small sip of water the morning of your procedure.  Any medications that can wait until after your procedure, please hold off on taking them. If you are receiving local anesthesia, you may take your medications as prescribed (unless your procedure requires certain medications to be held: SEE BLOOD THINNERS)    Diet:    No Restrictions    Driving:  No restrictions    Return to Clinic: Please schedule for an office follow-up visit 3 weeks after your procedure with Nery Spears NP        IMPORTANT NOTES - PLEASE READ CAREFULLY:    **If your procedure requires Blood  Thinners/Aspirin/NSAIDs to be held, please carefully look at the chart below to determine how long to hold medications prior to your procedure. If these medications are not held and were required to be held, your procedure will need to be rescheduled.  If you have questions on which medications need to be held or how long to hold those medications, please call the office at [phone].    **Please shower or bathe either the night before your procedure or the morning of your procedure to help reduce the risk of infection.    **If you are having a CERVICAL TRANSFORAMINAL EPIDURAL STEROID INJECTION (TF JOHNNY), you will need to be clean shaven. Please shave the morning of your procedure.    **If you are having an INITIAL OR CONFIRMATORY MEDIAL BRANCH BLOCK (I MBB OR C MBB), DO NOT TAKE ANY PAIN MEDICATION ON THE DAY OF YOUR PROCEDURE!!    **Please do not receive any vaccines within the two weeks leading up to your procedure.  Your procedure will need to be rescheduled if you receive a vaccine during that time.  You should also wait at least two weeks after your procedure before receiving a vaccine.    **If you are having a procedure on a Monday and have not received an arrival time, please call [phone] by noon on the Thursday before the procedure.    **If you are having a procedure on a Wednesday and have not received an arrival time, please call [phone] by noon on the Tuesday before the procedure.    **If you need to reschedule your procedure, please call [phone] at least 48 hours in advance, if possible.    **For questions about your procedure time, please call the surgery scheduler at [phone]. Questions regarding the procedure itself or medications, please call the office at [phone].  Please note that no one is in the office on Fridays, but we will get back to you on the next business day.  Tuesdays and Thursdays are clinic days with back-to-back patients. If you call the office on those  days, staff may not get back to you until the following business day.  You can also send us messages through the [employer]rett.    **Please note that all scheduled injections/procedures will be submitted to your insurance company for pre-authorization prior to your appointment.  If we do not receive authorization in time or if it is denied, you will be contacted by our office.  If you do not hear from us, insurance has either authorized the injection/procedure or it did not require authorization.  \"Authorization\" or \"No Authorization Required\"  does not guarantee payment.  IT IS THE RESPONSIBILITY OF THE PATIENT TO CHECK WITH THEIR INSURANCE COMPANY TO SEE IF THE INJECTION/PROCEDURE IS A COVERED BENEFIT.  If you would like to speak with an Leon  to get an estimate of the cost of your procedure, please call [phone].    **You may receive an e-mail with a link to an educational platform called [employer] which offers short videos explaining your procedure. Please note these videos may contain conflicting instructions; if there are any discrepancies between the video and information provided by our office, please adhere to the instructions you were given in the clinic by the nurse.              IF BLOOD THINNERS/ASPIRIN/NSAIDS DO NOT NEED TO BE HELD FOR YOUR PROCEDURE(S), YOU MAY SKIP THIS SECTION    IF REQUIRED FOR YOUR PROCEDURE, HOLD BLOOD THINNERS, BLOOD THINNING PRODUCTS, AND ASPIRIN CONTAINING PRODUCTS BEFORE YOUR PROCEDURE:      Medication:         Hold Before Procedure:         Resume After Procedure:      Brilinta (Ticagrelor)                  5 days prior   24 hours after  Coumadin (Warfarin)      5 days prior                   Evening of or 6 hours after     Effient (Prasugrel)                   7 days prior   24 hours after  Eliquis (Apixaban)                48 hours prior      24 hours after  Lovenox (Enoxaparin)            24 hours prior  24 hours after  Plavix (Clopidogrel)       6  days prior   24 hours after  Pletal (Cilostazol)      48 hours prior  24 hours after  Pradaxa (Dabigatran)      3 days prior   24 hours after  Ticlid (Ticiopidine)      10 days prior   24 hours after  Xarelto (Rivaroxaban)            48 hours prior  24 hours after  Dipyridamole (Aggrenox/Persantine) 7 days prior                        24 hours after      Avoid ALL other Aspirin, blood thinners and blood thinning products 3 FULL days prior to your procedure, including the following:    Anti-Platelet Drugs:    Agrylin (Anagredlide)        Pentoxyphylline (TRENTAL)  Eptifibatide (Integrilin)  Tirofiban (Aggrastat)  Vorapaxar (Zontivity)    Non-Steroidal Anti-Inflammatories:  Ibuprofen (Motrin, Advil, Midol, Nuprin)  Naproxen (Naprosin, Aleve, Ananprox)  Celecoxib (CELEBREX)  Dicolofenac (Voltaren, Cataflam, Arthrotec)  Diflunisal (DOLOBID)  Etodaolac (LODINE)  Fenoprofen (ANADAID)  Indomethacin (INDOCIN)  Ketoprofen (ORUDIS, ORUVAIL)  Ketoralac (TORADOL)  Meloxicam (PONSTEL)  Nabumetone (RELAFEN)  Oxaprozin (DAYPRO)  Piroxicam (FLEDANE)  Rofecoxib (VIOXX)  Sulindac (CLINORIL)  Tolmetrin (TOECTIN)  Valdecoxib (BEXTRA)    Aspirin Products:  Kristin  Excedrin  Bufferin  Anacin  [place]    Anti-Coagulants:  Heparin  Dalteparin (Fragmin)  Edoxaban (Savaysa)    Fondaparinux (Arixtra)  Vitamin K    Vitamins/ Supplements:  Vitamin E  Vitamin D  Fish Oil  Glucosamine  Ginseng   Garlic  Green Tea  Ginkgo Biloba  Turmeric  Saw Palmetto  Echinacea  Ephedra  Feverfew  [facility]      **Please note: anticoagulants do NOT need to be held for the following procedures:  - In-Office Injections  - Coccyx Injections  - Sacroiliac Joint (SIJ) Injections  - Occipital Nerve Blocks  - Medial Branch Blocks (MBB)  - Radiofrequency Ablation (RFA)  - Right-Sided Lumbar Transforaminal Epidural Steroid Injections (Right Lumbar    TF JOHNNY).     All other procedures, the anticoagulant must be held for 3 days (unless otherwise noted)  prior to your procedure. Failure to hold these medications in time will result in a canceled procedure.      **If you are taking an anti-coagulant or anti-platelet drug, please check with your prescribing physician to make sure you can safely discontinue the medication as recommended.    **If you are taking any diet medications, either over-the-counter or prescribed, it must be stopped 2 weeks prior to surgery.    **Please note this is NOT an all-inclusive list. Please check with your primary care provider if you have any questions regarding medications to avoid prior to surgery.          If you are scheduled for a Kyphoplasty:    In addition to the above medications to hold, the following weight loss/diabetes medications MUST be stopped 7 days prior to Kyphoplasty:    Semaglutide: Wegovy (SubQ Weekly)  Ozempic (SubQ Weekly)  Rybelsus (Oral Tablet Daily)  Dulaglutide: Trulicity (SubQ Weekly)  Exenatide: Byetta (SubQ Twice Daily)  Bydureon Bcise (SubQ Weekly)  Liraglutide: Saxenda or Victoza (SubQ Daily)  Tirzepatide: Mounjaro (SubQ Weekly)    Combination Products (Contact Prescriber for Bridge Insulin)  Liraglutide and Insulin Degludec: Xultophy (SubQ Daily)        If you are unsure when you should be stopping the listed medications, please call [phone] and speak with Dr. Linder's staff.         Epidural Steroid Injection  General and Discharge Information      What is an epidural steroid injection?    This is a procedure done to help control pain, weakness, tingling or numbness from nerve irritation.      What causes nerve irritation?    There can be many causes, for example:  As we age, several parts of the back can break down, causing bulging or bone spurs. These can push into either the spinal cord (spinal stenosis) or into the opening where nerves leave the spine. This irritates the nerves, causing pain, numbness, weakness, or tingling.  The disc between the bones in the back may be \"bulging\" into the  nerve area, due to injury or breaking down with age. Bone spurs due to arthritis can push into these areas.  Scar tissue and changes after neck or back surgery may irritate the nerves as well.      Where is the epidural space?    It is located along the entire length of the spinal canal. The nerves pass through this space as they exit the spine. Injecting steroid (cortisone) medicine into this space may help relieve irritation, soreness, and swelling of the nerves at and near this level.                Where is the injection given?    An MRI, CT scan or your symptoms (where it hurts) will direct the doctor to the area causing pain. There are 3 places the injection may be given to you:    Along the middle of your spine  On either side of your spine  Up through your tailbone              How is the procedure done?    It will be done as an outpatient procedure. Before the procedure, tell your doctor if you have any allergies or are taking any blood thinners.    You will lie on your abdomen on an x-ray table.  The area to be injected will be cleansed with an antiseptic solution, which will feel cold. Then the area will be numbed with local anesthetic (you'll feel a \"pin prick\" and slight burning).  Using x-ray guidance, the doctor will inject the steroid along with saline or local anesthetic, into the epidural space. You may feel some pressure, but there should not be pain.      After the procedure:    You will be observed for about 15-30 minutes in the recovery area. The affected arm or leg may feel temporary numbness, tingling, or warmth. You may want to bring a  with you the first time until you see how you react to the injection. Some doctors require a ; ask your doctor.    The local anesthetic that was given during the procedure will wear off within a few hours, though for some patients can last up to 2 days. During this time, it may feel like the pain has resolved, but as the medication wears off, your  pain will return.  This does not mean that the steroid injection didn't work. The steroid will take 24 to 48 hours to begin to work and will reach its full effect in 2-4 weeks.  Some patients will require a second injection to help relieve the pain.  You and your doctor will decide if a second injection should be done, depending on how you feel. You may receive a total of 3 epidural injections over 6 to 12 months.       Discharge instructions:    You were given a number of medicines during the procedure. These may include sedatives, local anesthetics, steroids, and other medicines. Any of these drugs or the procedure itself can cause side effects, including drowsiness, temporary numbness, weakness, tingling, and soreness.    You may feel temporary numbness, weakness, tingling, or soreness:  In the neck, arm, or fingertips (if your procedure was done in the neck)  In the legs (if your procedure was done in your lower back)      What should I do when I get home?    Rest for a few hours as needed.  Walk with assistance as long as numbness, weakness, or drowsiness is present.  Resume activities as tolerated; do not overdo it.  Resume your regular diet.  Avoid driving until the numbness and the weakness wears off.  Follow your doctor's instructions about going back to work. Most patients return to work the next day.      Other instructions:    Take your medicines as usual. If you stopped taking your blood-thinning medicine, check with your doctor about when to resume the medicine.  You may apply ice for 15 minutes on and 60 minutes off.  The injection may cause increased pain for 1 to 2 days. Use your over-the-counter pain medicines as usual.  You may remove the bandage after several hours.  You may bathe or shower the next day. A small bruise and tenderness at the injection site is normal for 1 to 2 days. Avoid hot tubs or whirlpool tubs with jet sprays for 72 hours.  If you are diabetic, your blood glucose may increase  for several days. Call the person who manages your diabetes prior to the injection. They may want you to monitor your blood glucose more frequently in the days after receiving the shot. They may also recommend temporarily adjusting your blood glucose-lowering medications, if needed.      Common Side Effects:    Short-term pain and inflammation at and/or around the injection site.  This is known as a \"cortisone flare\" and typically occurs when the numbing medication is wearing off (hours to days after the injection).  A cortisone flare generally lasts for 1-2 days.  During this time, take it easy, rest, and apply ice indirectly (use a towel or cloth between the ice and your skin).  You can also try over-the-counter pain and inflammation medications such as Tylenol and Ibuprofen.  If the symptoms are severe, please contact your physician.  Facial Flushing - Your face and chest may become red and warm. This generally occurs within the first 24 hours after the injection and should resolve within 1-2 days.  There aren't really any treatments for the flushing, but you should still inform your physician in case the dosage of steroid needs to be adjusted for future injections.  Sleep Changes - The steroid can cause some insomnia.  Try creating a comfortable environment to promote sleep (cool, dark room) as well as performing mindfulness practices (breathing or meditation). You should not use over-the-counter sleep aid medication without speaking with your physician.  High Blood Glucose - Cortisone shots can increase your blood glucose level for a few days following the injection.  If you experience abnormally high blood glucose in the few days after receiving a cortisone shot, go to your nearest emergency room. Symptoms may include increased thirst, frequent urination, and a pounding headache.       Call us if you have:    Excessive or abnormal bleeding, persistent chills, or fever over 100 degrees Farenheit.  A major  change in the pattern or level of your pain.  A severe headache that does not go away with usual treatment methods.    If you cannot reach your doctor, go to the nearest Emergency Room.         Be on the lookout for potentially serious side effects, such as:    Symptoms of an allergic reaction, including hives, shortness of breath, or swelling of lips, tongue, or face  Symptoms of a joint infection, such as fever, chills, and pain  Severe joint pain  Very elevated blood glucose readings    SEEK EMERGENCY CARE IMMEDIATELY IF YOU EXPERIENCE   POTENTIALLY SERIOUS SIDE EFFECTS!        Statement Selected

## 2025-04-28 ENCOUNTER — APPOINTMENT (OUTPATIENT)
Dept: OBGYN | Facility: CLINIC | Age: 40
End: 2025-04-28
Payer: COMMERCIAL

## 2025-04-28 ENCOUNTER — OUTPATIENT (OUTPATIENT)
Dept: OUTPATIENT SERVICES | Facility: HOSPITAL | Age: 40
LOS: 1 days | End: 2025-04-28
Payer: COMMERCIAL

## 2025-04-28 VITALS
WEIGHT: 173.06 LBS | TEMPERATURE: 98 F | OXYGEN SATURATION: 100 % | RESPIRATION RATE: 16 BRPM | HEIGHT: 69 IN | DIASTOLIC BLOOD PRESSURE: 82 MMHG | HEART RATE: 81 BPM | SYSTOLIC BLOOD PRESSURE: 138 MMHG

## 2025-04-28 DIAGNOSIS — O02.1 MISSED ABORTION: ICD-10-CM

## 2025-04-28 DIAGNOSIS — Z78.9 OTHER SPECIFIED HEALTH STATUS: ICD-10-CM

## 2025-04-28 DIAGNOSIS — Z11.3 ENCOUNTER FOR SCREENING FOR INFECTIONS WITH A PREDOMINANTLY SEXUAL MODE OF TRANSMISSION: ICD-10-CM

## 2025-04-28 DIAGNOSIS — D22.9 MELANOCYTIC NEVI, UNSPECIFIED: Chronic | ICD-10-CM

## 2025-04-28 LAB
ANION GAP SERPL CALC-SCNC: 14 MMOL/L — SIGNIFICANT CHANGE UP (ref 5–17)
APTT BLD: 32.8 SEC — SIGNIFICANT CHANGE UP (ref 26.1–36.8)
BLD GP AB SCN SERPL QL: NEGATIVE — SIGNIFICANT CHANGE UP
BUN SERPL-MCNC: 8 MG/DL — SIGNIFICANT CHANGE UP (ref 7–23)
CALCIUM SERPL-MCNC: 9.8 MG/DL — SIGNIFICANT CHANGE UP (ref 8.4–10.5)
CHLORIDE SERPL-SCNC: 101 MMOL/L — SIGNIFICANT CHANGE UP (ref 96–108)
CO2 SERPL-SCNC: 24 MMOL/L — SIGNIFICANT CHANGE UP (ref 22–31)
CREAT SERPL-MCNC: 0.54 MG/DL — SIGNIFICANT CHANGE UP (ref 0.5–1.3)
EGFR: 120 ML/MIN/1.73M2 — SIGNIFICANT CHANGE UP
EGFR: 120 ML/MIN/1.73M2 — SIGNIFICANT CHANGE UP
FIBRINOGEN PPP-MCNC: 343 MG/DL — SIGNIFICANT CHANGE UP (ref 200–445)
GLUCOSE SERPL-MCNC: 87 MG/DL — SIGNIFICANT CHANGE UP (ref 70–99)
HCT VFR BLD CALC: 42.7 % — SIGNIFICANT CHANGE UP (ref 34.5–45)
HGB BLD-MCNC: 13.6 G/DL — SIGNIFICANT CHANGE UP (ref 11.5–15.5)
INR BLD: 1 RATIO — SIGNIFICANT CHANGE UP (ref 0.85–1.16)
MCHC RBC-ENTMCNC: 27.1 PG — SIGNIFICANT CHANGE UP (ref 27–34)
MCHC RBC-ENTMCNC: 31.9 G/DL — LOW (ref 32–36)
MCV RBC AUTO: 85.1 FL — SIGNIFICANT CHANGE UP (ref 80–100)
NRBC BLD AUTO-RTO: 0 /100 WBCS — SIGNIFICANT CHANGE UP (ref 0–0)
PLATELET # BLD AUTO: 274 K/UL — SIGNIFICANT CHANGE UP (ref 150–400)
POTASSIUM SERPL-MCNC: 4.1 MMOL/L — SIGNIFICANT CHANGE UP (ref 3.5–5.3)
POTASSIUM SERPL-SCNC: 4.1 MMOL/L — SIGNIFICANT CHANGE UP (ref 3.5–5.3)
PROTHROM AB SERPL-ACNC: 11.4 SEC — SIGNIFICANT CHANGE UP (ref 9.9–13.4)
RBC # BLD: 5.02 M/UL — SIGNIFICANT CHANGE UP (ref 3.8–5.2)
RBC # FLD: 13.3 % — SIGNIFICANT CHANGE UP (ref 10.3–14.5)
RH IG SCN BLD-IMP: POSITIVE — SIGNIFICANT CHANGE UP
SODIUM SERPL-SCNC: 139 MMOL/L — SIGNIFICANT CHANGE UP (ref 135–145)
WBC # BLD: 6.66 K/UL — SIGNIFICANT CHANGE UP (ref 3.8–10.5)
WBC # FLD AUTO: 6.66 K/UL — SIGNIFICANT CHANGE UP (ref 3.8–10.5)

## 2025-04-28 PROCEDURE — 86901 BLOOD TYPING SEROLOGIC RH(D): CPT

## 2025-04-28 PROCEDURE — 76815 OB US LIMITED FETUS(S): CPT

## 2025-04-28 PROCEDURE — 85384 FIBRINOGEN ACTIVITY: CPT

## 2025-04-28 PROCEDURE — 86850 RBC ANTIBODY SCREEN: CPT

## 2025-04-28 PROCEDURE — 99204 OFFICE O/P NEW MOD 45 MIN: CPT | Mod: 25,57

## 2025-04-28 PROCEDURE — 85610 PROTHROMBIN TIME: CPT

## 2025-04-28 PROCEDURE — 80048 BASIC METABOLIC PNL TOTAL CA: CPT

## 2025-04-28 PROCEDURE — 85730 THROMBOPLASTIN TIME PARTIAL: CPT

## 2025-04-28 PROCEDURE — 85027 COMPLETE CBC AUTOMATED: CPT

## 2025-04-28 PROCEDURE — 86900 BLOOD TYPING SEROLOGIC ABO: CPT

## 2025-04-28 PROCEDURE — G0463: CPT

## 2025-04-28 RX ORDER — SODIUM CHLORIDE 9 G/1000ML
1000 INJECTION, SOLUTION INTRAVENOUS
Refills: 0 | Status: DISCONTINUED | OUTPATIENT
Start: 2025-04-29 | End: 2025-05-13

## 2025-04-28 RX ORDER — MISOPROSTOL 200 UG/1
200 TABLET ORAL
Qty: 2 | Refills: 0 | Status: ACTIVE | COMMUNITY
Start: 2025-04-28 | End: 1900-01-01

## 2025-04-28 NOTE — H&P PST ADULT - PROBLEM SELECTOR PLAN 1
Planned for D&C on 4/29/25. Surgical instructions reviewed and provided to patient. CBC, BMP, PT/INR, PTT, Fibrinogen Clauss and T&S obtained at [facility].

## 2025-04-28 NOTE — H&P PST ADULT - HISTORY OF PRESENT ILLNESS
39yo  measuring 12 weeks pregnant with diagnosed with missed  now presents to [facility] for D&C on 25. Patient denies fever, chills, SOB, chest pain.

## 2025-04-29 ENCOUNTER — TRANSCRIPTION ENCOUNTER (OUTPATIENT)
Age: 40
End: 2025-04-29

## 2025-04-29 ENCOUNTER — APPOINTMENT (OUTPATIENT)
Dept: OBGYN | Facility: HOSPITAL | Age: 40
End: 2025-04-29

## 2025-04-29 ENCOUNTER — OUTPATIENT (OUTPATIENT)
Dept: OUTPATIENT SERVICES | Facility: HOSPITAL | Age: 40
LOS: 1 days | End: 2025-04-29
Payer: COMMERCIAL

## 2025-04-29 VITALS
HEART RATE: 75 BPM | SYSTOLIC BLOOD PRESSURE: 107 MMHG | TEMPERATURE: 98 F | DIASTOLIC BLOOD PRESSURE: 68 MMHG | RESPIRATION RATE: 15 BRPM | OXYGEN SATURATION: 100 %

## 2025-04-29 VITALS
DIASTOLIC BLOOD PRESSURE: 75 MMHG | WEIGHT: 173.06 LBS | HEART RATE: 78 BPM | RESPIRATION RATE: 16 BRPM | SYSTOLIC BLOOD PRESSURE: 120 MMHG | HEIGHT: 69 IN | TEMPERATURE: 99 F | OXYGEN SATURATION: 100 %

## 2025-04-29 DIAGNOSIS — O02.1 MISSED ABORTION: ICD-10-CM

## 2025-04-29 DIAGNOSIS — D22.9 MELANOCYTIC NEVI, UNSPECIFIED: Chronic | ICD-10-CM

## 2025-04-29 LAB
C TRACH RRNA SPEC QL NAA+PROBE: NOT DETECTED
N GONORRHOEA RRNA SPEC QL NAA+PROBE: NOT DETECTED
RH IG SCN BLD-IMP: POSITIVE — SIGNIFICANT CHANGE UP
SOURCE AMPLIFICATION: NORMAL

## 2025-04-29 PROCEDURE — 88305 TISSUE EXAM BY PATHOLOGIST: CPT

## 2025-04-29 PROCEDURE — 76998 US GUIDE INTRAOP: CPT | Mod: 26

## 2025-04-29 PROCEDURE — 59821 TREATMENT OF MISCARRIAGE: CPT

## 2025-04-29 PROCEDURE — 88305 TISSUE EXAM BY PATHOLOGIST: CPT | Mod: 26

## 2025-04-29 RX ORDER — SODIUM CHLORIDE 9 G/1000ML
500 INJECTION, SOLUTION INTRAVENOUS ONCE
Refills: 0 | Status: COMPLETED | OUTPATIENT
Start: 2025-04-29 | End: 2025-04-29

## 2025-04-29 RX ORDER — ONDANSETRON HCL/PF 4 MG/2 ML
4 VIAL (ML) INJECTION ONCE
Refills: 0 | Status: DISCONTINUED | OUTPATIENT
Start: 2025-04-29 | End: 2025-05-13

## 2025-04-29 RX ORDER — LIDOCAINE HCL/PF 10 MG/ML
0.2 VIAL (ML) INJECTION ONCE
Refills: 0 | Status: COMPLETED | OUTPATIENT
Start: 2025-04-29 | End: 2025-04-29

## 2025-04-29 RX ORDER — DOXYCYCLINE HYCLATE 100 MG
200 TABLET ORAL ONCE
Refills: 0 | Status: COMPLETED | OUTPATIENT
Start: 2025-04-29 | End: 2025-04-29

## 2025-04-29 RX ORDER — FENTANYL CITRATE-0.9 % NACL/PF 100MCG/2ML
25 SYRINGE (ML) INTRAVENOUS
Refills: 0 | Status: DISCONTINUED | OUTPATIENT
Start: 2025-04-29 | End: 2025-04-29

## 2025-04-29 RX ADMIN — SODIUM CHLORIDE 500 MILLILITER(S): 9 INJECTION, SOLUTION INTRAVENOUS at 13:35

## 2025-04-29 NOTE — BRIEF OPERATIVE NOTE - OPERATION/FINDINGS
anteverted uterus approximately 14 week size  cervix 2cm dilated  fetus and placenta measuring approximately 12 weeks, all fetal parts accounted for  30u pitocin administered in IV fluids, TXA administered intraoepratively  thin endometrial stripe in transverse and sagittal views on ultrasound at conclusion of procedure  BT: B+ anteverted uterus approximately 14 week size  cervix 2cm dilated  fetus and placenta measuring approximately 12 weeks, all fetal parts accounted for  30u pitocin administered in IV fluids, TXA administered intraoperatively  thin endometrial stripe in transverse and sagittal views on ultrasound at conclusion of procedure  BT: B+

## 2025-04-29 NOTE — BRIEF OPERATIVE NOTE - NSICDXBRIEFPREOP_GEN_ALL_CORE_FT
PRE-OP DIAGNOSIS:  17 weeks gestation of pregnancy 29-Apr-2025 13:37:47 17w4d Taty Marinelli  Left ovarian cyst 29-Apr-2025 13:37:40  Taty Marinelli  Multigravida of advanced maternal age, second trimester 29-Apr-2025 13:37:30  Taty Marinelli  Fetal demise before 20 weeks with retention of dead fetus 29-Apr-2025 13:37:16  Taty Marinelli

## 2025-04-29 NOTE — BRIEF OPERATIVE NOTE - NSICDXBRIEFPOSTOP_GEN_ALL_CORE_FT
POST-OP DIAGNOSIS:  Multigravida of advanced maternal age, second trimester 29-Apr-2025 13:38:09  Taty Marinelli  Left ovarian cyst 29-Apr-2025 13:38:05  Taty Marinelli  Fetal demise before 20 weeks with retention of dead fetus 29-Apr-2025 13:38:16  Taty Marinelli  17 weeks gestation of pregnancy 29-Apr-2025 13:38:00 17w4d Taty Marinelli

## 2025-04-29 NOTE — ASU DISCHARGE PLAN (ADULT/PEDIATRIC) - ASU DC SPECIAL INSTRUCTIONSFT
After your surgery it is normal to experience:    •	Vaginal bleeding- can last 1-2 weeks and should not be heavier than a period. It may come and go and be red, brown or pink. Use pads, not tampons.  •	Cramping- Is common especially within the first 24 hours. This should be relieved by taking over the counter motrin/advil or Tylenol. Alternate Tylenol and Motrin every 3 hours for pain control  •	Vaginal soreness/irritation- can occur in the first few days after surgery because of the instruments that were used in the vagina. Soreness can be treated with ice pack and irritation can be taken care of with an emollient such as balmex or aquaphor that you can put directly on the irritated area.    Restrictions: For 10-14 days after the surgery you should avoid the following:    •	Tampons  •	Sex  •	Vigorous gym exercise  •	Swimming  pools and tub baths  •	Wait a day or two before going back to work    Anesthesia Precautions:  For the next 12 hours do not:   •	drive a car,  •	 operate power tools or machinery,  •	drink alcohol, beer, or wine,   •	make important personal or business decisions    Diet:   •	Resume Regular diet but Progress diet slowly     Physician Notification- Warning signs to look out for  •	Heavy Vaginal Bleeding   •	Shortness of breath or chest pain  •	Severe Abdominal Pain  •	Persistent nausea and vomiting  •	Pain not relieved by medications  •	Fever greater than 100.4®F  •	Inability to tolerate liquids or foods  •	Unable to urinate after 8 hours

## 2025-04-29 NOTE — ASU DISCHARGE PLAN (ADULT/PEDIATRIC) - CALL YOUR DOCTOR IF YOU HAVE ANY OF THE FOLLOWING:
Bleeding that does not stop/Pain not relieved by Medications/Fever greater than (need to indicate Fahrenheit or Celsius) Bleeding that does not stop/Swelling that gets worse/Pain not relieved by Medications/Fever greater than (need to indicate Fahrenheit or Celsius)/Wound/Surgical Site with redness, or foul smelling discharge or pus/Nausea and vomiting that does not stop/Unable to urinate/Inability to tolerate liquids or foods

## 2025-04-29 NOTE — BRIEF OPERATIVE NOTE - NSICDXBRIEFPROCEDURE_GEN_ALL_CORE_FT
PROCEDURES:  Surgical treatment, missed , second trimester 2025 13:36:51 exam under anesthesia, standard dilation and evacuation under ultrasound guidance Taty Marinelli

## 2025-04-29 NOTE — ASU DISCHARGE PLAN (ADULT/PEDIATRIC) - CARE PROVIDER_API CALL
Lay Meier  Obstetrics and Gynecology  [address], [address]  [place] [address]  Phone: [phone]  Fax: [phone]  Follow Up Time: 2 weeks

## 2025-04-29 NOTE — ASU DISCHARGE PLAN (ADULT/PEDIATRIC) - FINANCIAL ASSISTANCE
[facility] provides services at a reduced cost to those who are determined to be eligible through [facility]’s financial assistance program. Information regarding [facility]’s financial assistance program can be found by going to [email or web address] or by calling [phone].

## 2025-04-29 NOTE — ASU DISCHARGE PLAN (ADULT/PEDIATRIC) - NURSING INSTRUCTIONS
Your first dose of Tylenol was given at _______1:10 PM____. Next dose of Tylenol will be on or after ____7:10 PM_______ ,today/tonight and every 6 hours afterwards as needed for pain management, do not take any Tylenol containing products until this time.  Do not exceed more than 4000mg of Tylenol in one 24 hour setting. If no contraindications, you may alternate with Ibuprofen  3 hours after dose of Tylenol. Ibuprofen can be taken every 6 hours, received at 1:30 PM, next dose on or after 7:30 PM.

## 2025-04-30 ENCOUNTER — NON-APPOINTMENT (OUTPATIENT)
Age: 40
End: 2025-04-30

## 2025-04-30 PROBLEM — O02.1 MISSED ABORTION: Chronic | Status: ACTIVE | Noted: 2025-04-28

## 2025-05-05 ENCOUNTER — NON-APPOINTMENT (OUTPATIENT)
Age: 40
End: 2025-05-05

## 2025-05-08 NOTE — OB RN DELIVERY SUMMARY - NS_BIRTHTRAUMAA_OBGYN_ALL_OB
Outreach attempt was made to schedule a Medicare Wellness Visit. This was the first attempt. Contact was not made, left message.    LVM re: Return in about 1 year (around 2/12/2025) for Medicare Wellness Visit, 6 months Diabetes. Can schedule with a covering provider if nothing sooner available with PCP     
None

## 2025-05-12 ENCOUNTER — APPOINTMENT (OUTPATIENT)
Dept: OBGYN | Facility: CLINIC | Age: 40
End: 2025-05-12
Payer: COMMERCIAL

## 2025-05-12 DIAGNOSIS — Z98.890 OTHER SPECIFIED POSTPROCEDURAL STATES: ICD-10-CM

## 2025-05-12 PROCEDURE — 99214 OFFICE O/P EST MOD 30 MIN: CPT | Mod: 95

## 2025-05-15 LAB — SURGICAL PATHOLOGY STUDY: SIGNIFICANT CHANGE UP

## 2025-08-01 NOTE — OB PROVIDER H&P - PRO INTERPRETER NEED 2
D/C: Order noted for d/c. Pt confirmed d/c paperwork  have correct name. Discharge and education instructions reviewed with patient. Teach-back successful.  Pt verbalized understanding. Pt denied questions at this time. No acute distress noted. Patient instructed to follow-up as noted - return to emergency department if symptoms worsen. Patient verbalized understanding. Discharged per EDMD with discharge instructions. Pt discharged  to private vehicle. Patient stable upon departure. Thanked patient for choosing [facility] for care.       
English

## (undated) DEVICE — SOCK SPECIMEN 3/8"-1/2" MALE PORT

## (undated) DEVICE — VACUUM CURETTE BERKLEY OLYMPUS CURVED 8MM

## (undated) DEVICE — VACUUM CURETTE BUSSE HOSP CURVED 9MM

## (undated) DEVICE — VACUUM CURETTE BERKLEY OLYMPUS CURVED 9MM

## (undated) DEVICE — VACUUM CURETTE BERKLEY OLYMPUS CURVED 11MM

## (undated) DEVICE — VACUUM CURETTE BUSSE HOSP CURVED 14MM

## (undated) DEVICE — VACUUM CURETTE MEDGYN CURVED 13MM

## (undated) DEVICE — DRAPE 1/2 SHEET 40X57"

## (undated) DEVICE — VACUUM CURETTE BERKLEY OLYMPUS F TIP 6MM

## (undated) DEVICE — PACK LITHOTOMY

## (undated) DEVICE — WARMING BLANKET UPPER ADULT

## (undated) DEVICE — VACUUM CURETTE BUSSE HOSP STRAIGHT 7MM

## (undated) DEVICE — VACUUM CURETTE BERKLEY OLYMPUS CURVED 7MM

## (undated) DEVICE — TUBING UTERINE ASPIRATION 3/8" X 6FT W/O ADAPTER

## (undated) DEVICE — VACUUM CURETTE BUSSE HOSP CURVED 10MM

## (undated) DEVICE — VACUUM CURETTE BERKLEY OLYMPUS CURVED 12MM

## (undated) DEVICE — DRAPE LIGHT HANDLE COVER (GREEN)

## (undated) DEVICE — GLV 6.5 PROTEXIS (WHITE)

## (undated) DEVICE — SOL IRR POUR NS 0.9% 500ML

## (undated) DEVICE — PREP BETADINE KIT

## (undated) DEVICE — VACUUM CURETTE BUSSE HOSP CURVED 11MM

## (undated) DEVICE — VACUUM CURETTE BUSSE HOSP CURVED 12MM

## (undated) DEVICE — VACUUM CURETTE BERKLEY OLYMPUS CURVED 16MM X 1/2"